# Patient Record
Sex: MALE | Race: WHITE | Employment: OTHER | URBAN - METROPOLITAN AREA
[De-identification: names, ages, dates, MRNs, and addresses within clinical notes are randomized per-mention and may not be internally consistent; named-entity substitution may affect disease eponyms.]

---

## 2017-08-14 RX ORDER — FLUOROURACIL 50 MG/G
CREAM TOPICAL AS NEEDED
COMMUNITY
End: 2019-09-30

## 2017-08-15 ENCOUNTER — HOSPITAL ENCOUNTER (OUTPATIENT)
Age: 72
Setting detail: OUTPATIENT SURGERY
Discharge: HOME | End: 2017-08-15
Attending: INTERNAL MEDICINE | Admitting: INTERNAL MEDICINE
Payer: MEDICARE

## 2017-08-15 ENCOUNTER — ANESTHESIA (OUTPATIENT)
Dept: ENDOSCOPY | Age: 72
End: 2017-08-15
Payer: MEDICARE

## 2017-08-15 ENCOUNTER — ANESTHESIA EVENT (OUTPATIENT)
Dept: ENDOSCOPY | Age: 72
End: 2017-08-15
Payer: MEDICARE

## 2017-08-15 VITALS
DIASTOLIC BLOOD PRESSURE: 77 MMHG | HEART RATE: 58 BPM | BODY MASS INDEX: 27.22 KG/M2 | SYSTOLIC BLOOD PRESSURE: 118 MMHG | HEIGHT: 67 IN | TEMPERATURE: 97.4 F | RESPIRATION RATE: 18 BRPM | OXYGEN SATURATION: 98 % | WEIGHT: 173.44 LBS

## 2017-08-15 PROCEDURE — 76040000019: Performed by: INTERNAL MEDICINE

## 2017-08-15 PROCEDURE — 76060000031 HC ANESTHESIA FIRST 0.5 HR: Performed by: INTERNAL MEDICINE

## 2017-08-15 PROCEDURE — 77030027957 HC TBNG IRR ENDOGTR BUSS -B: Performed by: INTERNAL MEDICINE

## 2017-08-15 PROCEDURE — 74011250636 HC RX REV CODE- 250/636

## 2017-08-15 PROCEDURE — 74011250636 HC RX REV CODE- 250/636: Performed by: INTERNAL MEDICINE

## 2017-08-15 PROCEDURE — 74011250637 HC RX REV CODE- 250/637: Performed by: INTERNAL MEDICINE

## 2017-08-15 PROCEDURE — 74011000250 HC RX REV CODE- 250

## 2017-08-15 RX ORDER — PROPOFOL 10 MG/ML
INJECTION, EMULSION INTRAVENOUS AS NEEDED
Status: DISCONTINUED | OUTPATIENT
Start: 2017-08-15 | End: 2017-08-15 | Stop reason: HOSPADM

## 2017-08-15 RX ORDER — LIDOCAINE HYDROCHLORIDE 20 MG/ML
INJECTION, SOLUTION EPIDURAL; INFILTRATION; INTRACAUDAL; PERINEURAL AS NEEDED
Status: DISCONTINUED | OUTPATIENT
Start: 2017-08-15 | End: 2017-08-15 | Stop reason: HOSPADM

## 2017-08-15 RX ORDER — DEXTROMETHORPHAN/PSEUDOEPHED 2.5-7.5/.8
1.2 DROPS ORAL
Status: DISCONTINUED | OUTPATIENT
Start: 2017-08-15 | End: 2017-08-15 | Stop reason: HOSPADM

## 2017-08-15 RX ORDER — ATROPINE SULFATE 0.1 MG/ML
0.5 INJECTION INTRAVENOUS
Status: DISCONTINUED | OUTPATIENT
Start: 2017-08-15 | End: 2017-08-15 | Stop reason: HOSPADM

## 2017-08-15 RX ORDER — SODIUM CHLORIDE 9 MG/ML
150 INJECTION, SOLUTION INTRAVENOUS CONTINUOUS
Status: DISCONTINUED | OUTPATIENT
Start: 2017-08-15 | End: 2017-08-15 | Stop reason: HOSPADM

## 2017-08-15 RX ORDER — SODIUM CHLORIDE 0.9 % (FLUSH) 0.9 %
5-10 SYRINGE (ML) INJECTION EVERY 8 HOURS
Status: DISCONTINUED | OUTPATIENT
Start: 2017-08-15 | End: 2017-08-15 | Stop reason: HOSPADM

## 2017-08-15 RX ORDER — EPINEPHRINE 0.1 MG/ML
1 INJECTION INTRACARDIAC; INTRAVENOUS
Status: DISCONTINUED | OUTPATIENT
Start: 2017-08-15 | End: 2017-08-15 | Stop reason: HOSPADM

## 2017-08-15 RX ORDER — MIDAZOLAM HYDROCHLORIDE 1 MG/ML
.25-5 INJECTION, SOLUTION INTRAMUSCULAR; INTRAVENOUS
Status: DISCONTINUED | OUTPATIENT
Start: 2017-08-15 | End: 2017-08-15 | Stop reason: HOSPADM

## 2017-08-15 RX ORDER — SODIUM CHLORIDE 0.9 % (FLUSH) 0.9 %
5-10 SYRINGE (ML) INJECTION AS NEEDED
Status: DISCONTINUED | OUTPATIENT
Start: 2017-08-15 | End: 2017-08-15 | Stop reason: HOSPADM

## 2017-08-15 RX ORDER — SODIUM CHLORIDE 9 MG/ML
INJECTION, SOLUTION INTRAVENOUS
Status: DISCONTINUED | OUTPATIENT
Start: 2017-08-15 | End: 2017-08-15 | Stop reason: HOSPADM

## 2017-08-15 RX ADMIN — LIDOCAINE HYDROCHLORIDE 60 MG: 20 INJECTION, SOLUTION EPIDURAL; INFILTRATION; INTRACAUDAL; PERINEURAL at 11:08

## 2017-08-15 RX ADMIN — PROPOFOL 30 MG: 10 INJECTION, EMULSION INTRAVENOUS at 11:18

## 2017-08-15 RX ADMIN — PROPOFOL 20 MG: 10 INJECTION, EMULSION INTRAVENOUS at 11:21

## 2017-08-15 RX ADMIN — PROPOFOL 20 MG: 10 INJECTION, EMULSION INTRAVENOUS at 11:16

## 2017-08-15 RX ADMIN — PROPOFOL 80 MG: 10 INJECTION, EMULSION INTRAVENOUS at 11:14

## 2017-08-15 RX ADMIN — PROPOFOL 20 MG: 10 INJECTION, EMULSION INTRAVENOUS at 11:24

## 2017-08-15 RX ADMIN — SODIUM CHLORIDE 150 ML/HR: 900 INJECTION, SOLUTION INTRAVENOUS at 10:47

## 2017-08-15 RX ADMIN — PROPOFOL 100 MG: 10 INJECTION, EMULSION INTRAVENOUS at 11:08

## 2017-08-15 RX ADMIN — PROPOFOL 20 MG: 10 INJECTION, EMULSION INTRAVENOUS at 11:11

## 2017-08-15 RX ADMIN — SODIUM CHLORIDE: 9 INJECTION, SOLUTION INTRAVENOUS at 11:00

## 2017-08-15 NOTE — IP AVS SNAPSHOT
[address] [address] 
[phone] Patient: Michel Muñoz MRN: [id number]:[id number] You are allergic to the following No active allergies Recent Documentation Height Weight BMI Smoking Status 1.702 m 78.7 kg 27.16 kg/m2 Former Smoker Emergency Contacts Name Discharge Info Relation Home Work Mobile Hossein Dill  Other Relative [6] [phone] About your hospitalization You were admitted on:  August 15, 2017 You last received care in the:  [facility] ENDOSCOPY You were discharged on:  August 15, 2017 Unit phone number:  [phone] Why you were hospitalized Your primary diagnosis was:  Not on File Providers Seen During Your Hospitalizations Provider Role Specialty Primary office phone Sarrah Cabot, MD Attending Provider Gastroenterology [phone] Your Primary Care Physician (PCP) Primary Care Physician Office Phone Office Fax [phone] [facility] [address] [phone] Follow-up Information None Current Discharge Medication List  
  
CONTINUE these medications which have NOT CHANGED Dose & Instructions Dispensing Information Comments Morning Noon Evening Bedtime ASPIRIN PO Your last dose was: Your next dose is:    
   
   
 Dose:  81 mg Take 81 mg by mouth daily. Refills:  0  
     
   
   
   
  
 fluorouracil 5 % chemo cream  
Commonly known as:  EFUDEX Your last dose was: Your next dose is:    
   
   
 Apply  to affected area two (2) times a day. Refills:  0 Discharge Instructions [address] Moiz Chong M.D. 
[address] [place] [address] [address]ky 
[phone] COLONOSCOPY DISCHARGE INSTRUCTIONS Michel Muñoz [id number] 
1945 DISCOMFORT: 
 Redness at IV site- apply warm compress to area; if redness or soreness persist- contact your physician There may be a slight amount of blood passed from the rectum Gaseous discomfort- walking, belching will help relieve any discomfort You may not operate a vehicle for 12 hours You may not engage in an occupation involving machinery or appliances for the  rest of today You may not drink alcoholic beverages for at least 12 hours Avoid making any critical decisions for at least 24 hours DIET: 
 You may resume your normal diet, but some patients find that heavy or large  meals may lead to indigestion or vomiting. I suggest a light meal as first food  intake. ACTIVITY: 
You may resume your normal daily activities. It is recommended that you spend the remainder of the day resting - avoid any strenuous activity. CALL VASILIY Cortez ANY SIGN OF: Increasing pain, nausea, vomiting Abdominal distension (swelling) Significant bleeding (oral or rectal) Fever Pain in chest area Shortness of breath Additional Instructions: 
 Call Dr. Job Mcgrath if any questions or problems at [phone] Should have a repeat colonoscopy in 5 years. Colon with moderate internal hemorrhoids. No polyps. Discharge Orders None Introducing [facility]! [employer] introduces [employer] patient portal. Now you can access parts of your medical record, email your doctor's office, and request medication refills online. 1. In your internet browser, go to [email or web address] 2. Click on the First Time User? Click Here link in the Sign In box. You will see the New Member Sign Up page. 3. Enter your [employer] Access Code exactly as it appears below. You will not need to use this code after youve completed the sign-up process. If you do not sign up before the expiration date, you must request a new code. · student[employer] Access Code: [email or web address] Expires: 11/13/2017  9:29 AM 
 
 4. Enter the last four digits of your Social Security Number (xxxx) and Date of Birth (mm/dd/yyyy) as indicated and click Submit. You will be taken to the next sign-up page. 5. Create a [employer] ID. This will be your [employer] login ID and cannot be changed, so think of one that is secure and easy to remember. 6. Create a [employer] password. You can change your password at any time. 7. Enter your Password Reset Question and Answer. This can be used at a later time if you forget your password. 8. Enter your e-mail address. You will receive e-mail notification when new information is available in [address] 9. Click Sign Up. You can now view and download portions of your medical record. 10. Click the Download Summary menu link to download a portable copy of your medical information. If you have questions, please visit the Frequently Asked Questions section of the [employer] website. Remember, [employer] is NOT to be used for urgent needs. For medical emergencies, dial 911. Now available from your iPhone and Android! General Information Please provide this summary of care documentation to your next provider. Patient Signature:  ____________________________________________________________ Date:  ____________________________________________________________  
  
[facility] Provider Signature:  ____________________________________________________________ Date:  ____________________________________________________________

## 2017-08-15 NOTE — PROCEDURES
Jordi Whitley [address] Job Mcgrath M.D.  [place], [address] [facility]  [phone]               Colonoscopy Procedure Note    NAME: Anthony Saenz  :  1945  MRN:  [id number]    Indications:   Personal history of colon polyps (screening only)     : Bashir Gordon MD    Referring Provider:  Shakila England MD    Medicines:  MAC anesthesia      Procedure Details:  After informed consent was obtained with all risks and benefits of the procedure explained and preprocedure exam completed, the patient was placed in the left lateral decubitus position. Universal protocol for patient identification was performed and documented in the nursing notes. Throughout the procedure, the patient's blood pressure was monitored at least every five minutes; pulse, and oxygen saturations were monitored continuously. All vital signs were documented in the nursing notes. A digital rectal exam was performed and was normal.  The Olympus videocolonoscope  was inserted in the rectum and carefully advanced to the cecum, which was identified by the ileocecal valve and appendiceal orifice. The colonoscope was slowly withdrawn with careful evaluation between folds. Retroflexion in the rectum was performed; findings and interventions are described below. Procedure start time, extent reached time/cecum time, and procedure end time are documented in the nursing notes. The quality of preparation was good. Findings:   Rectum: Grade moderate internal hemorrhoid(s); Sigmoid: normal  Descending Colon: normal  Transverse Colon: normal  Ascending Colon: normal  Cecum: normal    Interventions:    none    Specimens:   * No specimens in log *    EBL:  None. Complications:   No immediate complications     Impression:  -Moderate internal hemorrhoids, rest of the colon was normal.    Recommendations:   -Repeat colonoscopy in 5 years.    If < 10 years, reason:  above average risk patient    Resume normal medication(s). Signed by:  Leonidas Perkins MD          8/15/2017  11:29 AM

## 2017-08-15 NOTE — H&P
[address], [address] [place] [address]          Pre-procedure History and Physical       NAME:  Ric Blizzard   :   1945   MRN:   [id number]     CHIEF COMPLAINT/HPI: See procedure note    PMH:  Past Medical History:   Diagnosis Date    Basal cell carcinoma of skin of scalp and neck     [facility]    Cancer [facility]) 2002    Basal Cancer /head    Dyslexia     Varicocele        PSH:  Past Surgical History:   Procedure Laterality Date    HX GI      colonoscopy x 2 - last     HX OTHER SURGICAL      BCCAs removed - several times       Allergies:  No Known Allergies    Home Medications:  Prior to Admission Medications   Prescriptions Last Dose Informant Patient Reported? Taking? ASPIRIN PO 8/15/2017 at Unknown time  Yes Yes   Sig: Take 81 mg by mouth daily. fluorouracil (EFUDEX) 5 % chemo cream 2017 at Unknown time  Yes Yes   Sig: Apply  to affected area two (2) times a day.       Facility-Administered Medications: None       Hospital Medications:  Current Facility-Administered Medications   Medication Dose Route Frequency    0.9% sodium chloride infusion  150 mL/hr IntraVENous CONTINUOUS    sodium chloride (NS) flush 5-10 mL  5-10 mL IntraVENous Q8H    sodium chloride (NS) flush 5-10 mL  5-10 mL IntraVENous PRN    midazolam (VERSED) injection 0.25-5 mg  0.25-5 mg IntraVENous Multiple    simethicone (MYLICON) 50QC/3.5LJ oral drops 80 mg  1.2 mL Oral Multiple    atropine injection 0.5 mg  0.5 mg IntraVENous ONCE PRN    EPINEPHrine (ADRENALIN) 0.1 mg/mL syringe 1 mg  1 mg Endoscopically ONCE PRN     Facility-Administered Medications Ordered in Other Encounters   Medication Dose Route Frequency    0.9% sodium chloride infusion   IntraVENous CONTINUOUS       Family History:  Family History   Problem Relation Age of Onset    Arthritis-osteo Mother     Arrhythmia Mother     Other Mother      brain hemorrhage    Other Father      PUD - removed part of stomach/\"cauterized stomach\"    Liver Disease Father      hepatitis due to blood transfusion    Heart Attack Father      during procedure or surgery    Liver Disease Brother      hepatitis from meat    Dementia Brother     Heart Attack Brother     Stroke Brother     Other Brother      \"cauterized stomach\"    Depression Brother     Anesth Problems Neg Hx        Social History:  Social History   Substance Use Topics    Smoking status: Former Smoker    Smokeless tobacco: Never Used      Comment: smoked 25 yr - 25 yrs old    Alcohol use No         PHYSICAL EXAM PRIOR TO SEDATION:  General: Alert, in no acute distress    Lungs:            CTA bilaterally  Heart:  Normal S1, S2    Abdomen: Soft, Non distended, Non tender. Normoactive bowel sounds. Assessment:   Stable for sedation administration.   Date of last colonoscopy: 5 yrs, Polyps  No    Plan:   · Endoscopic procedure with sedation     Signed By: Andrews Chappell MD     8/15/2017  11:09 AM

## 2017-08-15 NOTE — ANESTHESIA POSTPROCEDURE EVALUATION
Post-Anesthesia Evaluation and Assessment    Patient: Scooby Mcclain MRN: [id number]  SSN: [id number]    YOB: 1945  Age: 71 y.o. Sex: male       Cardiovascular Function/Vital Signs  Visit Vitals    /77    Pulse (!) 58    Temp 36.3 °C (97.4 °F)    Resp 18    Ht 5' 7\" (1.702 m)    Wt 78.7 kg (173 lb 7 oz)    SpO2 98%    BMI 27.16 kg/m2       Patient is status post MAC anesthesia for Procedure(s):  COLONOSCOPY. Nausea/Vomiting: None    Postoperative hydration reviewed and adequate. Pain:  Pain Scale 1: Numeric (0 - 10) (08/15/17 1200)  Pain Intensity 1: 0 (08/15/17 1200)   Managed    Neurological Status: At baseline    Mental Status and Level of Consciousness: Arousable    Pulmonary Status:   O2 Device: Room air (08/15/17 1200)   Adequate oxygenation and airway patent    Complications related to anesthesia: None    Post-anesthesia assessment completed.  No concerns    Signed By: Hao Molina DO     August 15, 2017

## 2017-08-15 NOTE — DISCHARGE INSTRUCTIONS
[address] [facility] Gabriel Reed M.D.  [address] [facility], [address] [facility]  [phone]          COLONOSCOPY DISCHARGE INSTRUCTIONS    Ric Blizzard  [id number]  1945    DISCOMFORT:  Redness at IV site- apply warm compress to area; if redness or soreness persist- contact your physician  There may be a slight amount of blood passed from the rectum  Gaseous discomfort- walking, belching will help relieve any discomfort  You may not operate a vehicle for 12 hours  You may not engage in an occupation involving machinery or appliances for the  rest of today  You may not drink alcoholic beverages for at least 12 hours  Avoid making any critical decisions for at least 24 hours    DIET:   You may resume your normal diet, but some patients find that heavy or large  meals may lead to indigestion or vomiting. I suggest a light meal as first food  intake. ACTIVITY:  You may resume your normal daily activities. It is recommended that you spend the remainder of the day resting - avoid any strenuous activity. CALL VASILIY Rodriguez ANY SIGN OF:   Increasing pain, nausea, vomiting  Abdominal distension (swelling)  Significant bleeding (oral or rectal)  Fever   Pain in chest area  Shortness of breath    Additional Instructions:   Call Dr. Gabriel Reed if any questions or problems at [phone]   Should have a repeat colonoscopy in 5 years. Colon with moderate internal hemorrhoids. No polyps.

## 2017-08-15 NOTE — PERIOP NOTES
TRANSFER - IN REPORT:    Verbal report received from Ramiro(name) on Kenny Clipper  being received for ordered procedure      Report consisted of patients Situation, Background, Assessment and   Recommendations(SBAR). Information from the following report(s)sbar was reviewed with the receiving nurse. Opportunity for questions and clarification was provided. Assessment completed upon patients arrival to unit and care assumed. May speak to his assistant, Daphney Marc. Patient has been evaluated by anesthesia pre-procedure. Patient alert and oriented. Vital signs will not be charted by the Endoscopy nurse. All vitals, airway, and loc are monitored by anesthesia staff throughout procedure. .Endoscope was pre-cleaned at bedside immediately following procedure by CORNELIUS Ennis

## 2018-05-27 ENCOUNTER — APPOINTMENT (OUTPATIENT)
Dept: GENERAL RADIOLOGY | Age: 73
End: 2018-05-27
Attending: EMERGENCY MEDICINE
Payer: MEDICARE

## 2018-05-27 ENCOUNTER — HOSPITAL ENCOUNTER (EMERGENCY)
Age: 73
Discharge: HOME | End: 2018-05-27
Attending: EMERGENCY MEDICINE
Payer: MEDICARE

## 2018-05-27 VITALS
TEMPERATURE: 98.4 F | RESPIRATION RATE: 17 BRPM | WEIGHT: 185 LBS | SYSTOLIC BLOOD PRESSURE: 122 MMHG | BODY MASS INDEX: 28.04 KG/M2 | HEIGHT: 68 IN | HEART RATE: 72 BPM | OXYGEN SATURATION: 98 % | DIASTOLIC BLOOD PRESSURE: 73 MMHG

## 2018-05-27 DIAGNOSIS — I48.91 ATRIAL FIBRILLATION WITH RAPID VENTRICULAR RESPONSE (HCC): Primary | ICD-10-CM

## 2018-05-27 LAB
ALBUMIN SERPL-MCNC: 3.9 G/DL (ref 3.5–5)
ALBUMIN/GLOB SERPL: 1 {RATIO} (ref 1.1–2.2)
ALP SERPL-CCNC: 120 U/L (ref 45–117)
ALT SERPL-CCNC: 31 U/L (ref 12–78)
ANION GAP SERPL CALC-SCNC: 8 MMOL/L (ref 5–15)
AST SERPL-CCNC: 26 U/L (ref 15–37)
BASOPHILS # BLD: 0.1 K/UL (ref 0–0.1)
BASOPHILS NFR BLD: 1 % (ref 0–1)
BILIRUB SERPL-MCNC: 0.4 MG/DL (ref 0.2–1)
BUN SERPL-MCNC: 25 MG/DL (ref 6–20)
BUN/CREAT SERPL: 19 (ref 12–20)
CALCIUM SERPL-MCNC: 9.3 MG/DL (ref 8.5–10.1)
CHLORIDE SERPL-SCNC: 107 MMOL/L (ref 97–108)
CO2 SERPL-SCNC: 27 MMOL/L (ref 21–32)
CREAT SERPL-MCNC: 1.29 MG/DL (ref 0.7–1.3)
DIFFERENTIAL METHOD BLD: NORMAL
EOSINOPHIL # BLD: 0.1 K/UL (ref 0–0.4)
EOSINOPHIL NFR BLD: 1 % (ref 0–7)
ERYTHROCYTE [DISTWIDTH] IN BLOOD BY AUTOMATED COUNT: 13.7 % (ref 11.5–14.5)
GLOBULIN SER CALC-MCNC: 4 G/DL (ref 2–4)
GLUCOSE SERPL-MCNC: 121 MG/DL (ref 65–100)
HCT VFR BLD AUTO: 42.4 % (ref 36.6–50.3)
HGB BLD-MCNC: 14.1 G/DL (ref 12.1–17)
IMM GRANULOCYTES # BLD: 0 K/UL (ref 0–0.04)
IMM GRANULOCYTES NFR BLD AUTO: 0 % (ref 0–0.5)
LYMPHOCYTES # BLD: 2.9 K/UL (ref 0.8–3.5)
LYMPHOCYTES NFR BLD: 34 % (ref 12–49)
MCH RBC QN AUTO: 31.1 PG (ref 26–34)
MCHC RBC AUTO-ENTMCNC: 33.3 G/DL (ref 30–36.5)
MCV RBC AUTO: 93.4 FL (ref 80–99)
MONOCYTES # BLD: 0.9 K/UL (ref 0–1)
MONOCYTES NFR BLD: 10 % (ref 5–13)
NEUTS SEG # BLD: 4.5 K/UL (ref 1.8–8)
NEUTS SEG NFR BLD: 54 % (ref 32–75)
NRBC # BLD: 0 K/UL (ref 0–0.01)
NRBC BLD-RTO: 0 PER 100 WBC
PLATELET # BLD AUTO: 267 K/UL (ref 150–400)
PMV BLD AUTO: 10.4 FL (ref 8.9–12.9)
POTASSIUM SERPL-SCNC: 3.9 MMOL/L (ref 3.5–5.1)
PROT SERPL-MCNC: 7.9 G/DL (ref 6.4–8.2)
RBC # BLD AUTO: 4.54 M/UL (ref 4.1–5.7)
SODIUM SERPL-SCNC: 142 MMOL/L (ref 136–145)
TROPONIN I SERPL-MCNC: <0.04 NG/ML
TSH SERPL DL<=0.05 MIU/L-ACNC: 2.55 UIU/ML (ref 0.36–3.74)
WBC # BLD AUTO: 8.5 K/UL (ref 4.1–11.1)

## 2018-05-27 PROCEDURE — 85025 COMPLETE CBC W/AUTO DIFF WBC: CPT | Performed by: EMERGENCY MEDICINE

## 2018-05-27 PROCEDURE — 99285 EMERGENCY DEPT VISIT HI MDM: CPT

## 2018-05-27 PROCEDURE — 93005 ELECTROCARDIOGRAM TRACING: CPT

## 2018-05-27 PROCEDURE — 80053 COMPREHEN METABOLIC PANEL: CPT | Performed by: EMERGENCY MEDICINE

## 2018-05-27 PROCEDURE — 96360 HYDRATION IV INFUSION INIT: CPT

## 2018-05-27 PROCEDURE — 74011250637 HC RX REV CODE- 250/637: Performed by: EMERGENCY MEDICINE

## 2018-05-27 PROCEDURE — 96361 HYDRATE IV INFUSION ADD-ON: CPT

## 2018-05-27 PROCEDURE — 71046 X-RAY EXAM CHEST 2 VIEWS: CPT

## 2018-05-27 PROCEDURE — 84484 ASSAY OF TROPONIN QUANT: CPT | Performed by: EMERGENCY MEDICINE

## 2018-05-27 PROCEDURE — 74011250636 HC RX REV CODE- 250/636: Performed by: EMERGENCY MEDICINE

## 2018-05-27 PROCEDURE — 36415 COLL VENOUS BLD VENIPUNCTURE: CPT | Performed by: EMERGENCY MEDICINE

## 2018-05-27 PROCEDURE — 84443 ASSAY THYROID STIM HORMONE: CPT | Performed by: EMERGENCY MEDICINE

## 2018-05-27 RX ORDER — METOPROLOL SUCCINATE 50 MG/1
25 TABLET, EXTENDED RELEASE ORAL
Status: COMPLETED | OUTPATIENT
Start: 2018-05-27 | End: 2018-05-27

## 2018-05-27 RX ORDER — METOPROLOL SUCCINATE 25 MG/1
25 TABLET, EXTENDED RELEASE ORAL DAILY
Qty: 30 TAB | Refills: 0 | Status: SHIPPED | OUTPATIENT
Start: 2018-05-27

## 2018-05-27 RX ADMIN — METOPROLOL SUCCINATE 25 MG: 50 TABLET, EXTENDED RELEASE ORAL at 19:42

## 2018-05-27 RX ADMIN — SODIUM CHLORIDE 1000 ML: 900 INJECTION, SOLUTION INTRAVENOUS at 17:50

## 2018-05-27 NOTE — ED PROVIDER NOTES
HPI Comments: 68 y.o. male with past medical history significant for basal cancer and varicocele who presents from home accompanied by friend with chief complaint of chest pain. Patient reports an occasional  \"twinge\" in his chest for months. Today, patient checked his HR on his apple watch when he felt the twinge and his HR was going back and forth between . His friend who is a MD came to see him and advised that he go to the ED. Patient does not feel the twinge now. He has not been getting adequate sleep due to work and travel. He drinks tea and coke daily. No new OTC meds. He takes a baby aspirin daily and took two day because of the twinge. No nausea, vomiting, shortness of breath, blood/black stools, lightheadedness, or dizziness. Patient has never seen a cardiologist. There are no other acute medical concerns at this time. Social hx: Former smoker. No alcohol use. No illicit drug use. PCP: Mandy Haro MD    Note written by Jass Campuzano, as dictated by Nanci Gabriel MD 4:52 PM      The history is provided by the patient.         Past Medical History:   Diagnosis Date    [facility]al [facility] and neck     [facility]    Cancer [facility]) 2002    Basal Cancer /head    Dyslexia     Varicocele        Past Surgical History:   Procedure Laterality Date    COLONOSCOPY N/A 8/15/2017    COLONOSCOPY performed by Shalonda Souza MD at [address] HX GI      colonoscopy x 2 - last 2012    HX OTHER SURGICAL      BCCAs removed - several times         Family History:   Problem Relation Age of Onset    Arthritis-osteo Mother     Arrhythmia Mother     Other Mother      brain hemorrhage    Other Father      PUD - removed part of stomach/\"cauterized stomach\"    Liver Disease Father      hepatitis due to blood transfusion    Heart Attack Father      during procedure or surgery    Liver Disease Brother      hepatitis from meat    Dementia Brother     Heart Attack Brother  Stroke Brother     Other Brother      \"cauterized stomach\"    Depression Brother     Anesth Problems Neg Hx        Social History     Social History    Marital status: SINGLE     Spouse name: N/A    Number of children: N/A    Years of education: N/A     Occupational History    Not on file. Social History Main Topics    Smoking status: Former Smoker    Smokeless tobacco: Never Used      Comment: smoked 25 yr - 25 yrs old    Alcohol use No    Drug use: No    Sexual activity: Not on file     Other Topics Concern    Not on file     Social History Narrative         ALLERGIES: Review of patient's allergies indicates no known allergies. Review of Systems   Constitutional: Negative for chills and fever. HENT: Negative for rhinorrhea and sore throat. Respiratory: Negative for cough and shortness of breath. Cardiovascular: Positive for chest pain (\"twinge\"). Gastrointestinal: Negative for abdominal pain, diarrhea, nausea and vomiting. Genitourinary: Negative for dysuria and hematuria. Musculoskeletal: Negative for arthralgias and myalgias. Skin: Negative for pallor and rash. Neurological: Negative for dizziness, weakness and light-headedness. All other systems reviewed and are negative. Note written by Jass Hairston, as dictated by Clark Blizzard, MD 4:52 PM    Vitals:    05/27/18 1620 05/27/18 1640   BP: 123/82    Pulse: (!) 139    Resp: 18    Temp: 98 °F (36.7 °C)    SpO2: 97% 96%   Weight: 83.9 kg (185 lb)    Height: 5' 8\" (1.727 m)             Physical Exam     Vital signs reviewed. Nursing notes reviewed.     Const:  No acute distress, well developed, well nourished  Head:  Atraumatic, normocephalic  Eyes:  PERRL, conjunctiva normal, no scleral icterus  Neck:  Supple, trachea midline  Cardiovascular:  RRR, no murmurs, no gallops, no rubs  Resp:  No resp distress, no increased work of breathing, no wheezes, no rhonchi, no rales,  Abd:  Soft, non-tender, non-distended, no rebound, no guarding, no CVA tenderness  :  Deferred  MSK:  No pedal edema, normal ROM  Neuro:  Alert and oriented x3, no cranial nerve defect  Skin:  Warm, dry, intact  Psych: normal mood and affect, behavior is normal, judgement and thought content is normal    Note written by Jass Conway, as dictated by Ayesha Butler MD 4:52 PM    MDM  Number of Diagnoses or Management Options  Atrial fibrillation with rapid ventricular response [facility]):      Amount and/or Complexity of Data Reviewed  Clinical lab tests: ordered and reviewed  Tests in the radiology section of CPT®: ordered and reviewed  Review and summarize past medical records: yes    Patient Progress  Patient progress: stable    ED Course     Pt. Presents to the ER in atrial fibrillation with rapid ventricular response. Her converted to a sinus rhythm on his own. Pt. Remained in a sinus rhythm in the ER. I spoke with cardiology and will start him on toprol xl 25mg daily. Pt. To f/u with cardiology or return to the ER with worsening sx.     Procedures

## 2018-05-27 NOTE — DISCHARGE INSTRUCTIONS
Atrial Fibrillation: Care Instructions  Your Care Instructions    Atrial fibrillation is an irregular and often fast heartbeat. Treating this condition is important for several reasons. It can cause blood clots, which can travel from your heart to your brain and cause a stroke. If you have a fast heartbeat, you may feel lightheaded, dizzy, and weak. An irregular heartbeat can also increase your risk for heart failure. Atrial fibrillation is often the result of another heart condition, such as high blood pressure or coronary artery disease. Making changes to improve your heart condition will help you stay healthy and active. Follow-up care is a key part of your treatment and safety. Be sure to make and go to all appointments, and call your doctor if you are having problems. It's also a good idea to know your test results and keep a list of the medicines you take. How can you care for yourself at home? Medicines  ? · Take your medicines exactly as prescribed. Call your doctor if you think you are having a problem with your medicine. You will get more details on the specific medicines your doctor prescribes. ? · If your doctor has given you a blood thinner to prevent a stroke, be sure you get instructions about how to take your medicine safely. Blood thinners can cause serious bleeding problems. ? · Do not take any vitamins, over-the-counter drugs, or herbal products without talking to your doctor first.   ? Lifestyle changes  ? · Do not smoke. Smoking can increase your chance of a stroke and heart attack. If you need help quitting, talk to your doctor about stop-smoking programs and medicines. These can increase your chances of quitting for good. ? · Eat a heart-healthy diet. ? · Stay at a healthy weight. Lose weight if you need to.   ? · Limit alcohol to 2 drinks a day for men and 1 drink a day for women. Too much alcohol can cause health problems. ? · Avoid colds and flu.  Get a pneumococcal vaccine shot. If you have had one before, ask your doctor whether you need another dose. Get a flu shot every year. If you must be around people with colds or flu, wash your hands often. Activity  ? · If your doctor recommends it, get more exercise. Walking is a good choice. Bit by bit, increase the amount you walk every day. Try for at least 30 minutes on most days of the week. You also may want to swim, bike, or do other activities. Your doctor may suggest that you join a cardiac rehabilitation program so that you can have help increasing your physical activity safely. ? · Start light exercise if your doctor says it is okay. Even a small amount will help you get stronger, have more energy, and manage stress. Walking is an easy way to get exercise. Start out by walking a little more than you did in the hospital. Gradually increase the amount you walk. ? · When you exercise, watch for signs that your heart is working too hard. You are pushing too hard if you cannot talk while you are exercising. If you become short of breath or dizzy or have chest pain, sit down and rest immediately. ? · Check your pulse regularly. Place two fingers on the artery at the palm side of your wrist, in line with your thumb. If your heartbeat seems uneven or fast, talk to your doctor. When should you call for help? Call 911 anytime you think you may need emergency care. For example, call if:  ? · You have symptoms of a heart attack. These may include:  ¨ Chest pain or pressure, or a strange feeling in the chest.  ¨ Sweating. ¨ Shortness of breath. ¨ Nausea or vomiting. ¨ Pain, pressure, or a strange feeling in the back, neck, jaw, or upper belly or in one or both shoulders or arms. ¨ Lightheadedness or sudden weakness. ¨ A fast or irregular heartbeat. After you call 911, the  may tell you to chew 1 adult-strength or 2 to 4 low-dose aspirin. Wait for an ambulance. Do not try to drive yourself.    ? · You have symptoms of a stroke. These may include:  ¨ Sudden numbness, tingling, weakness, or loss of movement in your face, arm, or leg, especially on only one side of your body. ¨ Sudden vision changes. ¨ Sudden trouble speaking. ¨ Sudden confusion or trouble understanding simple statements. ¨ Sudden problems with walking or balance. ¨ A sudden, severe headache that is different from past headaches. ? · You passed out (lost consciousness). ?Call your doctor now or seek immediate medical care if:  ? · You have new or increased shortness of breath. ? · You feel dizzy or lightheaded, or you feel like you may faint. ? · Your heart rate becomes irregular. ? · You can feel your heart flutter in your chest or skip heartbeats. Tell your doctor if these symptoms are new or worse. ? Watch closely for changes in your health, and be sure to contact your doctor if you have any problems. Where can you learn more? Go to http://brayan-solo.info/. Enter U020 in the search box to learn more about \"Atrial Fibrillation: Care Instructions. \"  Current as of: September 21, 2016  Content Version: 11.4  © 3317-5483 [employer]. Care instructions adapted under license by [employer] (which disclaims liability or warranty for this information). If you have questions about a medical condition or this instruction, always ask your healthcare professional. [address] any warranty or liability for your use of this information.

## 2018-05-27 NOTE — ED TRIAGE NOTES
Pt complains of feeling weird heartbeat and states his apple watch was going from  (back and forth). Denies shortness of breath and chest pain/discomfort.

## 2018-05-28 LAB
ATRIAL RATE: 156 BPM
ATRIAL RATE: 80 BPM
CALCULATED P AXIS, ECG09: 54 DEGREES
CALCULATED R AXIS, ECG10: -17 DEGREES
CALCULATED R AXIS, ECG10: -36 DEGREES
CALCULATED T AXIS, ECG11: 46 DEGREES
CALCULATED T AXIS, ECG11: 90 DEGREES
DIAGNOSIS, 93000: NORMAL
DIAGNOSIS, 93000: NORMAL
P-R INTERVAL, ECG05: 168 MS
Q-T INTERVAL, ECG07: 310 MS
Q-T INTERVAL, ECG07: 354 MS
QRS DURATION, ECG06: 106 MS
QRS DURATION, ECG06: 110 MS
QTC CALCULATION (BEZET), ECG08: 408 MS
QTC CALCULATION (BEZET), ECG08: 491 MS
VENTRICULAR RATE, ECG03: 151 BPM
VENTRICULAR RATE, ECG03: 80 BPM

## 2019-09-25 ENCOUNTER — HOSPITAL ENCOUNTER (OUTPATIENT)
Dept: PREADMISSION TESTING | Age: 74
Discharge: HOME | End: 2019-09-25
Payer: MEDICARE

## 2019-09-25 VITALS
DIASTOLIC BLOOD PRESSURE: 67 MMHG | TEMPERATURE: 97.9 F | WEIGHT: 184 LBS | SYSTOLIC BLOOD PRESSURE: 116 MMHG | HEART RATE: 58 BPM | HEIGHT: 68 IN | BODY MASS INDEX: 27.89 KG/M2

## 2019-09-25 LAB
BASOPHILS # BLD: 0.1 K/UL (ref 0–0.1)
BASOPHILS NFR BLD: 1 % (ref 0–1)
DIFFERENTIAL METHOD BLD: ABNORMAL
EOSINOPHIL # BLD: 0.2 K/UL (ref 0–0.4)
EOSINOPHIL NFR BLD: 5 % (ref 0–7)
ERYTHROCYTE [DISTWIDTH] IN BLOOD BY AUTOMATED COUNT: 13.1 % (ref 11.5–14.5)
HCT VFR BLD AUTO: 38.2 % (ref 36.6–50.3)
HGB BLD-MCNC: 12.5 G/DL (ref 12.1–17)
IMM GRANULOCYTES # BLD AUTO: 0 K/UL (ref 0–0.04)
IMM GRANULOCYTES NFR BLD AUTO: 0 % (ref 0–0.5)
LYMPHOCYTES # BLD: 1.9 K/UL (ref 0.8–3.5)
LYMPHOCYTES NFR BLD: 39 % (ref 12–49)
MCH RBC QN AUTO: 30.8 PG (ref 26–34)
MCHC RBC AUTO-ENTMCNC: 32.7 G/DL (ref 30–36.5)
MCV RBC AUTO: 94.1 FL (ref 80–99)
MONOCYTES # BLD: 0.5 K/UL (ref 0–1)
MONOCYTES NFR BLD: 11 % (ref 5–13)
NEUTS SEG # BLD: 2.2 K/UL (ref 1.8–8)
NEUTS SEG NFR BLD: 44 % (ref 32–75)
NRBC # BLD: 0 K/UL (ref 0–0.01)
NRBC BLD-RTO: 0 PER 100 WBC
PLATELET # BLD AUTO: 236 K/UL (ref 150–400)
PMV BLD AUTO: 10.8 FL (ref 8.9–12.9)
RBC # BLD AUTO: 4.06 M/UL (ref 4.1–5.7)
WBC # BLD AUTO: 5 K/UL (ref 4.1–11.1)

## 2019-09-25 PROCEDURE — 85025 COMPLETE CBC W/AUTO DIFF WBC: CPT

## 2019-09-25 PROCEDURE — 93005 ELECTROCARDIOGRAM TRACING: CPT

## 2019-09-26 LAB
ATRIAL RATE: 54 BPM
CALCULATED P AXIS, ECG09: 44 DEGREES
CALCULATED R AXIS, ECG10: -12 DEGREES
CALCULATED T AXIS, ECG11: 31 DEGREES
DIAGNOSIS, 93000: NORMAL
P-R INTERVAL, ECG05: 188 MS
Q-T INTERVAL, ECG07: 420 MS
QRS DURATION, ECG06: 106 MS
QTC CALCULATION (BEZET), ECG08: 398 MS
VENTRICULAR RATE, ECG03: 54 BPM

## 2019-09-27 RX ORDER — CEFAZOLIN SODIUM/WATER 2 G/20 ML
2 SYRINGE (ML) INTRAVENOUS ONCE
Status: CANCELLED | OUTPATIENT
Start: 2019-09-30 | End: 2019-09-30

## 2019-09-30 ENCOUNTER — HOSPITAL ENCOUNTER (OUTPATIENT)
Age: 74
Setting detail: OUTPATIENT SURGERY
Discharge: HOME | End: 2019-09-30
Attending: PLASTIC SURGERY | Admitting: PLASTIC SURGERY
Payer: MEDICARE

## 2019-09-30 ENCOUNTER — ANESTHESIA (OUTPATIENT)
Dept: MEDSURG UNIT | Age: 74
End: 2019-09-30
Payer: MEDICARE

## 2019-09-30 ENCOUNTER — ANESTHESIA EVENT (OUTPATIENT)
Dept: MEDSURG UNIT | Age: 74
End: 2019-09-30
Payer: MEDICARE

## 2019-09-30 VITALS
TEMPERATURE: 97.2 F | OXYGEN SATURATION: 97 % | HEART RATE: 81 BPM | SYSTOLIC BLOOD PRESSURE: 142 MMHG | RESPIRATION RATE: 12 BRPM | BODY MASS INDEX: 27.89 KG/M2 | DIASTOLIC BLOOD PRESSURE: 77 MMHG | WEIGHT: 184 LBS | HEIGHT: 68 IN

## 2019-09-30 DIAGNOSIS — C44.319 BASAL CELL CARCINOMA (BCC) OF FOREHEAD: Primary | ICD-10-CM

## 2019-09-30 PROCEDURE — 77030040361 HC SLV COMPR DVT MDII -B: Performed by: PLASTIC SURGERY

## 2019-09-30 PROCEDURE — 76210000046 HC AMBSU PH II REC FIRST 0.5 HR: Performed by: PLASTIC SURGERY

## 2019-09-30 PROCEDURE — 88332 PATH CONSLTJ SURG EA ADD BLK: CPT

## 2019-09-30 PROCEDURE — 74011000250 HC RX REV CODE- 250: Performed by: NURSE ANESTHETIST, CERTIFIED REGISTERED

## 2019-09-30 PROCEDURE — 77030040356 HC CORD BPLR FRCP COVD -A: Performed by: PLASTIC SURGERY

## 2019-09-30 PROCEDURE — 74011250637 HC RX REV CODE- 250/637: Performed by: ANESTHESIOLOGY

## 2019-09-30 PROCEDURE — 76030000004 HC AMB SURG OR TIME 2 TO 2.5: Performed by: PLASTIC SURGERY

## 2019-09-30 PROCEDURE — 77030008467 HC STPLR SKN COVD -B: Performed by: PLASTIC SURGERY

## 2019-09-30 PROCEDURE — 77030020782 HC GWN BAIR PAWS FLX 3M -B

## 2019-09-30 PROCEDURE — 76210000034 HC AMBSU PH I REC 0.5 TO 1 HR: Performed by: PLASTIC SURGERY

## 2019-09-30 PROCEDURE — 76060000064 HC AMB SURG ANES 2 TO 2.5 HR: Performed by: PLASTIC SURGERY

## 2019-09-30 PROCEDURE — 74011250636 HC RX REV CODE- 250/636: Performed by: NURSE ANESTHETIST, CERTIFIED REGISTERED

## 2019-09-30 PROCEDURE — 77030026438 HC STYL ET INTUB CARD -A: Performed by: ANESTHESIOLOGY

## 2019-09-30 PROCEDURE — 88305 TISSUE EXAM BY PATHOLOGIST: CPT

## 2019-09-30 PROCEDURE — 77030002888 HC SUT CHRMC J&J -A: Performed by: PLASTIC SURGERY

## 2019-09-30 PROCEDURE — 77030011640 HC PAD GRND REM COVD -A: Performed by: PLASTIC SURGERY

## 2019-09-30 PROCEDURE — 74011000250 HC RX REV CODE- 250: Performed by: PLASTIC SURGERY

## 2019-09-30 PROCEDURE — 77030018836 HC SOL IRR NACL ICUM -A: Performed by: PLASTIC SURGERY

## 2019-09-30 PROCEDURE — 77030008684 HC TU ET CUF COVD -B: Performed by: ANESTHESIOLOGY

## 2019-09-30 PROCEDURE — 77030002986 HC SUT PROL J&J -A: Performed by: PLASTIC SURGERY

## 2019-09-30 PROCEDURE — 74011250636 HC RX REV CODE- 250/636: Performed by: ANESTHESIOLOGY

## 2019-09-30 PROCEDURE — 88331 PATH CONSLTJ SURG 1 BLK 1SPC: CPT

## 2019-09-30 PROCEDURE — 77030031139 HC SUT VCRL2 J&J -A: Performed by: PLASTIC SURGERY

## 2019-09-30 RX ORDER — SODIUM CHLORIDE, SODIUM LACTATE, POTASSIUM CHLORIDE, CALCIUM CHLORIDE 600; 310; 30; 20 MG/100ML; MG/100ML; MG/100ML; MG/100ML
INJECTION, SOLUTION INTRAVENOUS
Status: DISCONTINUED | OUTPATIENT
Start: 2019-09-30 | End: 2019-09-30 | Stop reason: HOSPADM

## 2019-09-30 RX ORDER — LIDOCAINE HYDROCHLORIDE AND EPINEPHRINE 10; 10 MG/ML; UG/ML
INJECTION, SOLUTION INFILTRATION; PERINEURAL AS NEEDED
Status: DISCONTINUED | OUTPATIENT
Start: 2019-09-30 | End: 2019-09-30 | Stop reason: HOSPADM

## 2019-09-30 RX ORDER — MIDAZOLAM HYDROCHLORIDE 1 MG/ML
1 INJECTION, SOLUTION INTRAMUSCULAR; INTRAVENOUS AS NEEDED
Status: DISCONTINUED | OUTPATIENT
Start: 2019-09-30 | End: 2019-09-30 | Stop reason: HOSPADM

## 2019-09-30 RX ORDER — HYDROMORPHONE HYDROCHLORIDE 2 MG/ML
INJECTION, SOLUTION INTRAMUSCULAR; INTRAVENOUS; SUBCUTANEOUS AS NEEDED
Status: DISCONTINUED | OUTPATIENT
Start: 2019-09-30 | End: 2019-09-30 | Stop reason: HOSPADM

## 2019-09-30 RX ORDER — LIDOCAINE HYDROCHLORIDE 10 MG/ML
0.1 INJECTION, SOLUTION EPIDURAL; INFILTRATION; INTRACAUDAL; PERINEURAL AS NEEDED
Status: DISCONTINUED | OUTPATIENT
Start: 2019-09-30 | End: 2019-09-30 | Stop reason: HOSPADM

## 2019-09-30 RX ORDER — HYDROMORPHONE HYDROCHLORIDE 1 MG/ML
0.2 INJECTION, SOLUTION INTRAMUSCULAR; INTRAVENOUS; SUBCUTANEOUS
Status: CANCELLED | OUTPATIENT
Start: 2019-09-30

## 2019-09-30 RX ORDER — ONDANSETRON 2 MG/ML
4 INJECTION INTRAMUSCULAR; INTRAVENOUS AS NEEDED
Status: CANCELLED | OUTPATIENT
Start: 2019-09-30

## 2019-09-30 RX ORDER — FENTANYL CITRATE 50 UG/ML
25 INJECTION, SOLUTION INTRAMUSCULAR; INTRAVENOUS
Status: CANCELLED | OUTPATIENT
Start: 2019-09-30

## 2019-09-30 RX ORDER — CEFAZOLIN SODIUM 1 G/3ML
INJECTION, POWDER, FOR SOLUTION INTRAMUSCULAR; INTRAVENOUS AS NEEDED
Status: DISCONTINUED | OUTPATIENT
Start: 2019-09-30 | End: 2019-09-30 | Stop reason: HOSPADM

## 2019-09-30 RX ORDER — PROPOFOL 10 MG/ML
INJECTION, EMULSION INTRAVENOUS AS NEEDED
Status: DISCONTINUED | OUTPATIENT
Start: 2019-09-30 | End: 2019-09-30 | Stop reason: HOSPADM

## 2019-09-30 RX ORDER — SODIUM CHLORIDE 0.9 % (FLUSH) 0.9 %
5-40 SYRINGE (ML) INJECTION EVERY 8 HOURS
Status: CANCELLED | OUTPATIENT
Start: 2019-09-30

## 2019-09-30 RX ORDER — ACETAMINOPHEN 325 MG/1
650 TABLET ORAL ONCE
Status: COMPLETED | OUTPATIENT
Start: 2019-09-30 | End: 2019-09-30

## 2019-09-30 RX ORDER — ROCURONIUM BROMIDE 10 MG/ML
INJECTION, SOLUTION INTRAVENOUS AS NEEDED
Status: DISCONTINUED | OUTPATIENT
Start: 2019-09-30 | End: 2019-09-30 | Stop reason: HOSPADM

## 2019-09-30 RX ORDER — MIDAZOLAM HYDROCHLORIDE 1 MG/ML
INJECTION, SOLUTION INTRAMUSCULAR; INTRAVENOUS AS NEEDED
Status: DISCONTINUED | OUTPATIENT
Start: 2019-09-30 | End: 2019-09-30 | Stop reason: HOSPADM

## 2019-09-30 RX ORDER — LIDOCAINE HYDROCHLORIDE 20 MG/ML
INJECTION, SOLUTION EPIDURAL; INFILTRATION; INTRACAUDAL; PERINEURAL AS NEEDED
Status: DISCONTINUED | OUTPATIENT
Start: 2019-09-30 | End: 2019-09-30 | Stop reason: HOSPADM

## 2019-09-30 RX ORDER — SODIUM CHLORIDE, SODIUM LACTATE, POTASSIUM CHLORIDE, CALCIUM CHLORIDE 600; 310; 30; 20 MG/100ML; MG/100ML; MG/100ML; MG/100ML
125 INJECTION, SOLUTION INTRAVENOUS CONTINUOUS
Status: CANCELLED | OUTPATIENT
Start: 2019-09-30

## 2019-09-30 RX ORDER — PHENYLEPHRINE HCL IN 0.9% NACL 0.4MG/10ML
SYRINGE (ML) INTRAVENOUS AS NEEDED
Status: DISCONTINUED | OUTPATIENT
Start: 2019-09-30 | End: 2019-09-30 | Stop reason: HOSPADM

## 2019-09-30 RX ORDER — MORPHINE SULFATE 10 MG/ML
2 INJECTION, SOLUTION INTRAMUSCULAR; INTRAVENOUS
Status: CANCELLED | OUTPATIENT
Start: 2019-09-30

## 2019-09-30 RX ORDER — FENTANYL CITRATE 50 UG/ML
50 INJECTION, SOLUTION INTRAMUSCULAR; INTRAVENOUS AS NEEDED
Status: DISCONTINUED | OUTPATIENT
Start: 2019-09-30 | End: 2019-09-30 | Stop reason: HOSPADM

## 2019-09-30 RX ORDER — SODIUM CHLORIDE 0.9 % (FLUSH) 0.9 %
5-40 SYRINGE (ML) INJECTION EVERY 8 HOURS
Status: DISCONTINUED | OUTPATIENT
Start: 2019-09-30 | End: 2019-09-30 | Stop reason: HOSPADM

## 2019-09-30 RX ORDER — SODIUM CHLORIDE, SODIUM LACTATE, POTASSIUM CHLORIDE, CALCIUM CHLORIDE 600; 310; 30; 20 MG/100ML; MG/100ML; MG/100ML; MG/100ML
125 INJECTION, SOLUTION INTRAVENOUS CONTINUOUS
Status: DISCONTINUED | OUTPATIENT
Start: 2019-09-30 | End: 2019-09-30 | Stop reason: HOSPADM

## 2019-09-30 RX ORDER — OXYCODONE AND ACETAMINOPHEN 5; 325 MG/1; MG/1
1 TABLET ORAL AS NEEDED
Status: CANCELLED | OUTPATIENT
Start: 2019-09-30

## 2019-09-30 RX ORDER — ONDANSETRON 2 MG/ML
INJECTION INTRAMUSCULAR; INTRAVENOUS AS NEEDED
Status: DISCONTINUED | OUTPATIENT
Start: 2019-09-30 | End: 2019-09-30 | Stop reason: HOSPADM

## 2019-09-30 RX ORDER — SODIUM CHLORIDE 9 MG/ML
25 INJECTION, SOLUTION INTRAVENOUS CONTINUOUS
Status: DISCONTINUED | OUTPATIENT
Start: 2019-09-30 | End: 2019-09-30 | Stop reason: HOSPADM

## 2019-09-30 RX ORDER — FENTANYL CITRATE 50 UG/ML
INJECTION, SOLUTION INTRAMUSCULAR; INTRAVENOUS AS NEEDED
Status: DISCONTINUED | OUTPATIENT
Start: 2019-09-30 | End: 2019-09-30 | Stop reason: HOSPADM

## 2019-09-30 RX ORDER — EPHEDRINE SULFATE/0.9% NACL/PF 50 MG/5 ML
SYRINGE (ML) INTRAVENOUS AS NEEDED
Status: DISCONTINUED | OUTPATIENT
Start: 2019-09-30 | End: 2019-09-30 | Stop reason: HOSPADM

## 2019-09-30 RX ORDER — MIDAZOLAM HYDROCHLORIDE 1 MG/ML
0.5 INJECTION, SOLUTION INTRAMUSCULAR; INTRAVENOUS
Status: CANCELLED | OUTPATIENT
Start: 2019-09-30

## 2019-09-30 RX ORDER — DIPHENHYDRAMINE HYDROCHLORIDE 50 MG/ML
12.5 INJECTION, SOLUTION INTRAMUSCULAR; INTRAVENOUS AS NEEDED
Status: CANCELLED | OUTPATIENT
Start: 2019-09-30 | End: 2019-09-30

## 2019-09-30 RX ORDER — DEXAMETHASONE SODIUM PHOSPHATE 4 MG/ML
INJECTION, SOLUTION INTRA-ARTICULAR; INTRALESIONAL; INTRAMUSCULAR; INTRAVENOUS; SOFT TISSUE AS NEEDED
Status: DISCONTINUED | OUTPATIENT
Start: 2019-09-30 | End: 2019-09-30 | Stop reason: HOSPADM

## 2019-09-30 RX ORDER — SODIUM CHLORIDE 0.9 % (FLUSH) 0.9 %
5-40 SYRINGE (ML) INJECTION AS NEEDED
Status: CANCELLED | OUTPATIENT
Start: 2019-09-30

## 2019-09-30 RX ORDER — SODIUM CHLORIDE 0.9 % (FLUSH) 0.9 %
5-40 SYRINGE (ML) INJECTION AS NEEDED
Status: DISCONTINUED | OUTPATIENT
Start: 2019-09-30 | End: 2019-09-30 | Stop reason: HOSPADM

## 2019-09-30 RX ORDER — SUCCINYLCHOLINE CHLORIDE 20 MG/ML
INJECTION INTRAMUSCULAR; INTRAVENOUS AS NEEDED
Status: DISCONTINUED | OUTPATIENT
Start: 2019-09-30 | End: 2019-09-30 | Stop reason: HOSPADM

## 2019-09-30 RX ORDER — BACITRACIN 500 [USP'U]/G
OINTMENT TOPICAL AS NEEDED
Status: DISCONTINUED | OUTPATIENT
Start: 2019-09-30 | End: 2019-09-30 | Stop reason: HOSPADM

## 2019-09-30 RX ORDER — HYDROCODONE BITARTRATE AND ACETAMINOPHEN 5; 325 MG/1; MG/1
1 TABLET ORAL
Qty: 20 TAB | Refills: 0 | Status: SHIPPED | OUTPATIENT
Start: 2019-09-30 | End: 2019-10-05

## 2019-09-30 RX ADMIN — SODIUM CHLORIDE, POTASSIUM CHLORIDE, SODIUM LACTATE AND CALCIUM CHLORIDE: 600; 310; 30; 20 INJECTION, SOLUTION INTRAVENOUS at 08:28

## 2019-09-30 RX ADMIN — SODIUM CHLORIDE, SODIUM LACTATE, POTASSIUM CHLORIDE, AND CALCIUM CHLORIDE 125 ML/HR: 600; 310; 30; 20 INJECTION, SOLUTION INTRAVENOUS at 08:22

## 2019-09-30 RX ADMIN — Medication 10 MG: at 09:30

## 2019-09-30 RX ADMIN — DEXAMETHASONE SODIUM PHOSPHATE 4 MG: 4 INJECTION, SOLUTION INTRAMUSCULAR; INTRAVENOUS at 08:43

## 2019-09-30 RX ADMIN — LIDOCAINE HYDROCHLORIDE 70 MG: 20 INJECTION, SOLUTION EPIDURAL; INFILTRATION; INTRACAUDAL; PERINEURAL at 08:34

## 2019-09-30 RX ADMIN — Medication 10 MG: at 09:42

## 2019-09-30 RX ADMIN — Medication 80 MCG: at 09:09

## 2019-09-30 RX ADMIN — Medication 40 MCG: at 09:13

## 2019-09-30 RX ADMIN — Medication 10 MG: at 09:17

## 2019-09-30 RX ADMIN — ROCURONIUM BROMIDE 5 MG: 10 SOLUTION INTRAVENOUS at 08:34

## 2019-09-30 RX ADMIN — ACETAMINOPHEN 650 MG: 325 TABLET, FILM COATED ORAL at 08:30

## 2019-09-30 RX ADMIN — PROPOFOL 50 MG: 10 INJECTION, EMULSION INTRAVENOUS at 08:59

## 2019-09-30 RX ADMIN — Medication 80 MCG: at 09:07

## 2019-09-30 RX ADMIN — CEFAZOLIN 2 G: 330 INJECTION, POWDER, FOR SOLUTION INTRAMUSCULAR; INTRAVENOUS at 08:45

## 2019-09-30 RX ADMIN — FENTANYL CITRATE 50 MCG: 50 INJECTION, SOLUTION INTRAMUSCULAR; INTRAVENOUS at 08:34

## 2019-09-30 RX ADMIN — SUCCINYLCHOLINE CHLORIDE 120 MG: 20 INJECTION, SOLUTION INTRAMUSCULAR; INTRAVENOUS at 08:34

## 2019-09-30 RX ADMIN — FENTANYL CITRATE 50 MCG: 50 INJECTION, SOLUTION INTRAMUSCULAR; INTRAVENOUS at 08:57

## 2019-09-30 RX ADMIN — ONDANSETRON HYDROCHLORIDE 4 MG: 2 INJECTION, SOLUTION INTRAVENOUS at 10:05

## 2019-09-30 RX ADMIN — MIDAZOLAM 2 MG: 1 INJECTION INTRAMUSCULAR; INTRAVENOUS at 08:28

## 2019-09-30 RX ADMIN — PROPOFOL 150 MG: 10 INJECTION, EMULSION INTRAVENOUS at 08:34

## 2019-09-30 RX ADMIN — HYDROMORPHONE HYDROCHLORIDE 0.5 MG: 2 INJECTION, SOLUTION INTRAMUSCULAR; INTRAVENOUS; SUBCUTANEOUS at 09:17

## 2019-09-30 NOTE — ANESTHESIA PREPROCEDURE EVALUATION
Anesthetic History   No history of anesthetic complications            Review of Systems / Medical History  Patient summary reviewed, nursing notes reviewed and pertinent labs reviewed    Pulmonary  Within defined limits                 Neuro/Psych   Within defined limits           Cardiovascular    Hypertension        Dysrhythmias : atrial fibrillation      Exercise tolerance: >4 METS     GI/Hepatic/Renal  Within defined limits              Endo/Other  Within defined limits           Other Findings              Physical Exam    Airway  Mallampati: II  TM Distance: > 6 cm  Neck ROM: normal range of motion   Mouth opening: Normal     Cardiovascular  Regular rate and rhythm,  S1 and S2 normal,  no murmur, click, rub, or gallop             Dental  No notable dental hx       Pulmonary  Breath sounds clear to auscultation               Abdominal  GI exam deferred       Other Findings            Anesthetic Plan    ASA: 2  Anesthesia type: general          Induction: Intravenous  Anesthetic plan and risks discussed with: Patient

## 2019-09-30 NOTE — ROUTINE PROCESS
Patient: Fernie Lim MRN: [id number]  SSN: [id number] YOB: 1945  Age: 69 y.o. Sex: male Patient is status post Procedure(s): EXCISION OF BASAL CELL CARCINOMA MID FOREHEAD WITH FROZEN SECTION AND ADJACENT TISSUE TRANSFER, EXCISION OF BASAL CELL CARCINOMA RIGHT FOREHEAD WITH FROZEN SECTION AND ADJACENT TISSUE TRANSFER, EXCISION OF LEFT SCALP SQUAMOUS CELL CARCINOMA WITH FROZEN SECTION AND PEDICLED FLAP CLOSURE; LEFT FOREHEAD LESION EXCISION WITH ADJACENT TISSUE TRANSFER. Surgeon(s) and Role: 
   * Sophia Hernandez MD - Primary Local/Dose/Irrigation:  SEE MAR Peripheral IV 09/30/19 Left;Posterior Hand (Active) Site Assessment Clean, dry, & intact 9/30/2019  8:18 AM  
Phlebitis Assessment 0 9/30/2019  8:18 AM  
Infiltration Assessment 0 9/30/2019  8:18 AM  
Dressing Status Clean, dry, & intact 9/30/2019  8:18 AM  
Dressing Type Transparent 9/30/2019  8:18 AM  
Hub Color/Line Status Pink 9/30/2019  8:18 AM  
Alcohol Cap Used Yes 9/30/2019  8:18 AM  
        
Airway - Endotracheal Tube 09/30/19 Oral (Active) Dressing/Packing:  Wound Head-Dressing Type: 4 x 4(TEGADERM) (09/30/19 1034) Splint/Cast:  ] Other:

## 2019-09-30 NOTE — BRIEF OP NOTE
BRIEF OPERATIVE NOTE    Date of Procedure: 9/30/2019   Preoperative Diagnosis: BASAL CELL MID FOREHEAD , BASAL CELL CARCINOMA RIGHT FOREHEAD , LEFT SCALP SQUAMOUS CELL CARCINOMA  Postoperative Diagnosis: BASAL CELL MID FOREHEAD , BASAL CELL CARCINOMA RIGHT FOREHEAD , LEFT SCALP SQUAMOUS CELL CARCINOMA    Procedure(s):  EXCISION OF BASAL CELL CARCINOMA MID FOREHEAD WITH FROZEN SECTION AND ADJACENT TISSUE TRANSFER, EXCISION OF BASAL CELL CARCINOMA RIGHT FOREHEAD WITH FROZEN SECTION AND ADJACENT TISSUE TRANSFER, EXCISION OF LEFT SCALP SQUAMOUS CELL CARCINOMA WITH FROZEN SECTION AND PEDICLED FLAP CLOSURE; LEFT FOREHEAD LESION EXCISION WITH ADJACENT TISSUE TRANSFER  Surgeon(s) and Role:     * Tone Hernandez MD - Primary         Surgical Assistant: Danyel Granda    Surgical Staff:  Circ-1: Wayne Mast RN  Circ-Relief: Patience Steiner RN  Registered Nurse Assistant: Josiah Smyth RN  Scrub RN-1: Belkis Weeks RN  Event Time In Time Out   Incision Start 4477    Incision Close 1033      Anesthesia: General   Estimated Blood Loss: minimal  Specimens:   ID Type Source Tests Collected by Time Destination   1 : Mid Forehead basal cell Frozen Section Forehead  Raquel Briseno MD 9/30/2019 8402 Pathology   2 : RIGHT FOREHEAD BASAL CELL CARCINOMA Frozen Section Forehead  Raquel Briseno MD 9/30/2019 7008 Pathology   3 : LEFT SCALP SQUAMOUS CELL Frozen Section Scalp  Raquel Briseno MD 9/30/2019 6805 Pathology   4 : RIGHT FOREHEAD BASAL CELL NEW LATERAL MARGIN Fresh Forehead  Raquel Briseno MD 9/30/2019 9105 Pathology   5 : LEFT FOREHEAD LESION Frozen Section Forehead  Raquel Briseno MD 9/30/2019 1004 Pathology   6 : MID FOREHEAD BASAL CELL NEW SUPERIOR MARGIN Fresh Forehead  Tone Hernandez MD 9/30/2019 1017 Pathology      Findings: none   Complications: none  Implants: * No implants in log *

## 2019-09-30 NOTE — ANESTHESIA POSTPROCEDURE EVALUATION
Procedure(s):  EXCISION OF BASAL CELL CARCINOMA MID FOREHEAD WITH FROZEN SECTION AND ADJACENT TISSUE TRANSFER, EXCISION OF BASAL CELL CARCINOMA RIGHT FOREHEAD WITH FROZEN SECTION AND ADJACENT TISSUE TRANSFER, EXCISION OF LEFT SCALP SQUAMOUS CELL CARCINOMA WITH FROZEN SECTION AND PEDICLED FLAP CLOSURE; LEFT FOREHEAD LESION EXCISION WITH ADJACENT TISSUE TRANSFER. general    Anesthesia Post Evaluation        Patient location during evaluation: PACU  Patient participation: complete - patient participated  Level of consciousness: awake  Pain management: adequate  Airway patency: patent  Anesthetic complications: no  Cardiovascular status: hemodynamically stable  Respiratory status: acceptable  Hydration status: acceptable  Comments: I have seen and evaluated the patient. The patient is ready for PACU discharge. [address], DO                         Vitals Value Taken Time   /67 9/30/2019 10:55 AM   Temp 36.2 °C (97.2 °F) 9/30/2019 10:47 AM   Pulse 70 9/30/2019 10:59 AM   Resp 12 9/30/2019 10:59 AM   SpO2 97 % 9/30/2019 10:59 AM   Vitals shown include unvalidated device data.

## 2019-09-30 NOTE — DISCHARGE INSTRUCTIONS
Bull Servin MD, Valentino Newcomer [facility]  [phone]    Minor Procedure post-operative instructions    You have had a minor surgical procedure. Please follow these simple instructions to help ensure a safe and comfortable recovery. Any questions can be directed to the office at [phone]. Bandages:  If bandages were placed, remove them 2 day(s) after surgery. If skin glue was used to cover your incisions, there might not be any bandages that require removal.    Expect a modest amount of redness (inflammation) and possibly some bruising to appear in the days following your surgery. This is normal and will resolve as the wound heals. The appearance of excessive wound redness, pus or fever is not normal and should be reported to the office. Bathing:  [ *** ] You may shower 2 day(s) after surgery. You may shampoo your hair and may use soap for your skin. No tub baths or swimming for 2 weeks. Remove any bandages before showering. If there is skin glue on your incision(s), it will fall off on its own. If it is still present after one week, you may scrub or peel it off. [ *** ]  Antibiotic ointment (such as bacitracin, vaseline) should be lightly applied 2-3 times per day to all incisions. If the incision is near the eye, you may be given an ophthalmic ointment to use. [ *** ]  Suture removal: You have sutures that need to be removed. Please call to schedule and appointment in 10 days. Pain:  Mild to moderate pain is to be expected after minor surgery and will generally be relieved by the use of Tylenol or ibuprofen agents (Advil, Motrin, Nuprin, etc.) You have been given a prescription for lortab. Swelling or discomfort will be improved by elevating the surgical site above the level of the heart, especially the face, scalp or arms, which can be elevated in bed by extra pillows.     Activity:  Please observe the following restrictions until you are cleared by your surgeon. [ *** ]  No heavy lifting greater than 10 pounds or strenuous activity. [ *** ]  Other:  ____________________    Follow-up appointment: please call the office at [phone] during regular business hours to schedule an appointment in ***. DISCHARGE SUMMARY from Nurse    PATIENT INSTRUCTIONS:    After general anesthesia or intravenous sedation, for 24 hours or while taking prescription Narcotics:  · Limit your activities  · Do not drive and operate hazardous machinery  · Do not make important personal or business decisions  · Do  not drink alcoholic beverages  · If you have not urinated within 8 hours after discharge, please contact your surgeon on call. Report the following to your surgeon:  · Excessive pain, swelling, redness or odor of or around the surgical area  · Temperature over 100.5  · Nausea and vomiting lasting longer than 4 hours or if unable to take medications  · Any signs of decreased circulation or nerve impairment to extremity: change in color, persistent  numbness, tingling, coldness or increase pain  · Any questions    What to do at Home:  Recommended activity: See surgical instructions. If you experience any of the following symptoms as noted above, please follow up with Dr. Tobi Jett. *  Please give a list of your current medications to your Primary Care Provider. *  Please update this list whenever your medications are discontinued, doses are      changed, or new medications (including over-the-counter products) are added. *  Please carry medication information at all times in case of emergency situations. These are general instructions for a healthy lifestyle:    No smoking/ No tobacco products/ Avoid exposure to second hand smoke  Surgeon General's Warning:  Quitting smoking now greatly reduces serious risk to your health.     Obesity, smoking, and sedentary lifestyle greatly increases your risk for illness    A healthy diet, regular physical exercise & weight monitoring are important for maintaining a healthy lifestyle    You may be retaining fluid if you have a history of heart failure or if you experience any of the following symptoms:  Weight gain of 3 pounds or more overnight or 5 pounds in a week, increased swelling in our hands or feet or shortness of breath while lying flat in bed. Please call your doctor as soon as you notice any of these symptoms; do not wait until your next office visit. The discharge information has been reviewed with the caregiver. The caregiver verbalized understanding. Discharge medications reviewed with the caregiver and appropriate educational materials and side effects teaching were provided.   ___________________________________________________________________________________________________________________________________

## 2019-09-30 NOTE — H&P
Pre-op History and Physical    CC: BASAL CELL MID FOREHEAD , BASAL CELL CARCINOMA RIGHT FOREHEAD , LEFT SCALP SQUAMOUS CELL CARCINOMA   HPI: 69y.o. year old male with BASAL CELL MID FOREHEAD , BASAL CELL CARCINOMA RIGHT FOREHEAD , LEFT SCALP SQUAMOUS CELL CARCINOMA for Procedure(s):  EXCISION OF BASAL CELL CARCINOMA MID FOREHEAD WITH FROZEN SECTION AND ADJACENT TISSUE TRANSFER, EXCISION OF BASAL CELL CARCINOMA RIGHT FOREHEAD WITH FROZEN SECTION AND ADJACENT TISSUE TRANSFER, EXCISION OF LEFT SCALP SQUAMOUS CELL CARCINOMA WITH FROZEN SECTION AND PEDICLED FLAP CLOSURE VS FULL THICKNESS SKIN GRAFT. Ludy Trimble   Past medical history:   Past Medical History:   Diagnosis Date    Arthritis     Basal cell carcinoma of skin of scalp and neck     [facility]    Cancer [facility]) 2002    Basal Cancer /head    Chronic pain     Dyslexia     Hypertension     Paroxysmal A-fib ([address]     Squamous cell carcinoma 2019    FOREHEAD    Varicocele       Past surgical history:   Past Surgical History:   Procedure Laterality Date    COLONOSCOPY N/A 8/15/2017    COLONOSCOPY performed by Eros Sosa MD at [address] [facility]      colonoscopy x 2 - last 2012    HX HEENT  2013    BCCA ,HEAD ,NECK     HX OTHER SURGICAL      BCCAs removed - several times      Family history:   Family History   Problem Relation Age of Onset    Arthritis-osteo Mother     Arrhythmia Mother     Other Mother         brain hemorrhage    Other Father         PUD - removed part of stomach/\"cauterized stomach\"    Liver Disease Father         hepatitis due to blood transfusion    Heart Attack Father         during procedure or surgery    Liver Disease Brother         hepatitis from meat    Dementia Brother     Heart Attack Brother     Stroke Brother     Other Brother         \"cauterized stomach\"    Depression Brother     Anesth Problems Neg Hx       Social history:   Social History     Socioeconomic History    Marital status: SINGLE     Spouse name: Not on file    Number of children: Not on file    Years of education: Not on file    Highest education level: Not on file   Occupational History    Not on file   Social Needs    Financial resource strain: Not on file    Food insecurity:     Worry: Not on file     Inability: Not on file    Transportation needs:     Medical: Not on file     Non-medical: Not on file   Tobacco Use    Smoking status: Former Smoker     Packs/day: 0.25     Years: 3.00     Pack years: 0.75     Last attempt to quit: 1980     Years since quittin.7    Smokeless tobacco: Never Used    Tobacco comment: smoked 25 yr - 25 yrs old   Substance and Sexual Activity    Alcohol use: No     Alcohol/week: 0.0 standard drinks    Drug use: No    Sexual activity: Not on file   Lifestyle    Physical activity:     Days per week: Not on file     Minutes per session: Not on file    Stress: Not on file   Relationships    Social connections:     Talks on phone: Not on file     Gets together: Not on file     Attends [employer] service: Not on file     Active member of club or organization: Not on file     Attends meetings of clubs or organizations: Not on file     Relationship status: Not on file    Intimate partner violence:     Fear of current or ex partner: Not on file     Emotionally abused: Not on file     Physically abused: Not on file     Forced sexual activity: Not on file   Other Topics Concern    Not on file   Social History Narrative    Not on file      Home Medications:   Prior to Admission medications    Medication Sig Start Date End Date Taking? Authorizing Provider   metoprolol succinate (TOPROL XL) 25 mg XL tablet Take 1 Tab by mouth daily. Patient taking differently: Take 25 mg by mouth daily (after breakfast). 18   Elizabeth Mcgrath MD   ASPIRIN PO Take 81 mg by mouth daily. Provider, Historical   fluorouracil (EFUDEX) 5 % chemo cream Apply  to affected area as needed.     Provider, Historical      Allergies: No Known Allergies   Review of systems:  Denies headache, fever, chills, weight change, congestion, sore throat, chest pain, shortness of breath, nausea, vomiting, diarrhea, constipation, abdominal pain, generalized weakness, muscle or joint pain, and rash. Physical Exam:  Vitals: There were no vitals taken for this visit. General: awake and alert, NAD  Neck: supple  Cor: RRR  Lungs: clear  Abdomen: soft, non-tender, non-distended  Extremities: no edema  Skin: no rash    Impression: BASAL CELL MID FOREHEAD , BASAL CELL CARCINOMA RIGHT FOREHEAD , LEFT SCALP SQUAMOUS CELL CARCINOMA    Plan:  Procedure(s):  EXCISION OF BASAL CELL CARCINOMA MID FOREHEAD WITH FROZEN SECTION AND ADJACENT TISSUE TRANSFER, EXCISION OF BASAL CELL CARCINOMA RIGHT FOREHEAD WITH FROZEN SECTION AND ADJACENT TISSUE TRANSFER, EXCISION OF LEFT SCALP SQUAMOUS CELL CARCINOMA WITH FROZEN SECTION AND PEDICLED FLAP CLOSURE VS FULL THICKNESS SKIN GRAFT. Pre-op History and Physical    CC: BASAL CELL MID FOREHEAD , BASAL CELL CARCINOMA RIGHT FOREHEAD , LEFT SCALP SQUAMOUS CELL CARCINOMA   HPI: 69y.o. year old male with BASAL CELL MID FOREHEAD , BASAL CELL CARCINOMA RIGHT FOREHEAD , LEFT SCALP SQUAMOUS CELL CARCINOMA for Procedure(s):  EXCISION OF BASAL CELL CARCINOMA MID FOREHEAD WITH FROZEN SECTION AND ADJACENT TISSUE TRANSFER, EXCISION OF BASAL CELL CARCINOMA RIGHT FOREHEAD WITH FROZEN SECTION AND ADJACENT TISSUE TRANSFER, EXCISION OF LEFT SCALP SQUAMOUS CELL CARCINOMA WITH FROZEN SECTION AND PEDICLED FLAP CLOSURE VS FULL THICKNESS SKIN GRAFT. Jossie Lloyd   Past medical history:   Past Medical History:   Diagnosis Date    Arthritis     [facility]al cell carcinoma of skin of scalp and neck     [facility]    Cancer [facility]) 2002    Basal Cancer /head    Chronic pain     Dyslexia     Hypertension     Paroxysmal A-fib ([address]     Squamous cell carcinoma 2019    FOREHEAD    Varicocele       Past surgical history:   Past Surgical History:   Procedure Laterality Date  COLONOSCOPY N/A 8/15/2017    COLONOSCOPY performed by Kimi Mathews MD at [address] [facility]      colonoscopy x 2 - last     HX HEENT      BCCA ,HEAD ,NECK     HX OTHER SURGICAL      BCCAs removed - several times      Family history:   Family History   Problem Relation Age of Onset    Arthritis-osteo Mother     Arrhythmia Mother     Other Mother         brain hemorrhage    Other Father         PUD - removed part of stomach/\"cauterized stomach\"    Liver Disease Father         hepatitis due to blood transfusion    Heart Attack Father         during procedure or surgery    Liver Disease Brother         hepatitis from meat    Dementia Brother     Heart Attack Brother     Stroke Brother     Other Brother         \"cauterized stomach\"    Depression Brother     Anesth Problems Neg Hx       Social history:   Social History     Socioeconomic History    Marital status: SINGLE     Spouse name: Not on file    Number of children: Not on file    Years of education: Not on file    Highest education level: Not on file   Occupational History    Not on file   Social Needs    Financial resource strain: Not on file    Food insecurity:     Worry: Not on file     Inability: Not on file    Transportation needs:     Medical: Not on file     Non-medical: Not on file   Tobacco Use    Smoking status: Former Smoker     Packs/day: 0.25     Years: 3.00     Pack years: 0.75     Last attempt to quit: 1980     Years since quittin.7    Smokeless tobacco: Never Used    Tobacco comment: smoked 25 yr - 25 yrs old   Substance and Sexual Activity    Alcohol use: No     Alcohol/week: 0.0 standard drinks    Drug use: No    Sexual activity: Not on file   Lifestyle    Physical activity:     Days per week: Not on file     Minutes per session: Not on file    Stress: Not on file   Relationships    Social connections:     Talks on phone: Not on file     Gets together: Not on file     Attends [employer] service: Not on file     Active member of club or organization: Not on file     Attends meetings of clubs or organizations: Not on file     Relationship status: Not on file    Intimate partner violence:     Fear of current or ex partner: Not on file     Emotionally abused: Not on file     Physically abused: Not on file     Forced sexual activity: Not on file   Other Topics Concern    Not on file   Social History Narrative    Not on file      Home Medications:   Prior to Admission medications    Medication Sig Start Date End Date Taking? Authorizing Provider   metoprolol succinate (TOPROL XL) 25 mg XL tablet Take 1 Tab by mouth daily. Patient taking differently: Take 25 mg by mouth daily (after breakfast). 5/27/18   Sandi Gamez MD   ASPIRIN PO Take 81 mg by mouth daily. Provider, Historical   fluorouracil (EFUDEX) 5 % chemo cream Apply  to affected area as needed. Provider, Historical      Allergies: No Known Allergies   Review of systems:  Denies headache, fever, chills, weight change, congestion, sore throat, chest pain, shortness of breath, nausea, vomiting, diarrhea, constipation, abdominal pain, generalized weakness, muscle or joint pain, and rash. Physical Exam:  Vitals: There were no vitals taken for this visit. General: awake and alert, NAD  Neck: supple  Cor: RRR  Lungs: clear  Abdomen: soft, non-tender, non-distended  Extremities: no edema  Skin: no rash    Impression: BASAL CELL MID FOREHEAD , BASAL CELL CARCINOMA RIGHT FOREHEAD , LEFT SCALP SQUAMOUS CELL CARCINOMA    Plan:  Procedure(s):  EXCISION OF BASAL CELL CARCINOMA MID FOREHEAD WITH FROZEN SECTION AND ADJACENT TISSUE TRANSFER, EXCISION OF BASAL CELL CARCINOMA RIGHT FOREHEAD WITH FROZEN SECTION AND ADJACENT TISSUE TRANSFER, EXCISION OF LEFT SCALP SQUAMOUS CELL CARCINOMA WITH FROZEN SECTION AND PEDICLED FLAP CLOSURE VS FULL THICKNESS SKIN GRAFT.

## 2019-10-01 NOTE — OP NOTES
[facility]  OPERATIVE REPORT    Name:  Juliet Evans  MR#:  [id number]  :  1945  ACCOUNT #:  [de-identified]  DATE OF SERVICE:  2019      PREOPERATIVE DIAGNOSES:  1.  Mid forehead basal cell carcinoma. 2.  Right forehead basal cell carcinoma. 3.  Left scalp squamous cell carcinoma. 4.  Left forehead lesion. POSTOPERATIVE DIAGNOSES:  1.  Mid forehead basal cell carcinoma. 2.  Right forehead basal cell carcinoma. 3.  Left scalp squamous cell carcinoma. 4.  Left forehead lesion. PROCEDURES PERFORMED:  1. Wide local excision of mid forehead basal cell carcinoma with frozen section and adjacent tissue transfer, measuring 5 x 5 cm for a total of 25 sq cm. 2.  Excision of right forehead basal cell carcinoma with frozen section and adjacent tissue transfer, measuring 5 x 5 cm for a total of 25 sq cm. 3.  Excision of left scalp squamous cell carcinoma with frozen section, measuring 5 cm with pedicled flap closure of the branches of the temporal artery. 4.  Excision of left forehead lesion with frozen section with adjacent tissue transfer, measuring 4 x 4 cm for a total of 16 sq cm. SURGEON:  Jackie Lewis MD    ASSISTANT:  Hector Ambriz.    ANESTHESIA:  General.    COMPLICATIONS:  None. SPECIMENS REMOVED:  1. Mid forehead basal cell carcinoma. 2.  Superior margin of the mid forehead basal cell carcinoma. 3.  Right forehead basal cell carcinoma. 4.  Right lateral margin of basal cell carcinoma. 5.  Left scalp squamous cell. 6.  Left forehead lesion. IMPLANTS:  None. ESTIMATED BLOOD LOSS:  Minimal.    DRAINS:  None. INDICATIONS FOR SURGERY:  The patient is a 32-year-old man who has a history of previous skin cancers in the past.  He is here because he has biopsy-proven basal cell carcinoma of the mid and right forehead as well as the left scalp squamous cell.   He is here for wide local excision of these areas in addition to an area on the left brow, that is unknown neoplasm. The patient was marked in the preoperative holding area. He signed informed consent. He was taken to the operating room, placed on the operating room table in supine position. He was placed under general anesthesia without complication. A time-out was performed in order to verify the patient's identity and surgical sites which were both correct. He was given preoperative antibiotics which consisted of IV Ancef. He was injected with local anesthesia which consisted of 1% lidocaine with epinephrine. An incision was made using #15C blade, all of the areas were excised with new blade at each time. They were marked in the superior aspect and sent to Pathology for frozen section. The left scalp was marked in the posterior aspect. Frozen section returned that the mid forehead area had approximated at the margin at the superior aspect. This area was re-excised and sent to Pathology for permanent section. The right forehead lesion frozen section returned as clear except it was very close to the lateral margin, and this was re-excised and sent to Pathology for permanent section. The left scalp squamous cell was clear at frozen section and the left forehead lesion proved to be biopsy site changes with no tumor seen. In the brow and forehead, these areas were elevated using tenotomy scissors and skin hooks widely and then closed after the adjacent tissues were transferred into the surgical defect. This was done in order to obtain a tension-free closure. Attempts were made to be aggressive with this technique in order to avoid full-thickness skin graft on the patient's forehead. At this point, the attention was turned to the scalp. Elevation was done based off the branches of the temporal artery. This was elevated using tenotomy scissors and skin hooks.   This was then flapped into the area, where the defect was created and the deep tissues were closed using buried interrupted 3-0 Vicryl and the skin was closed using surgical staples as there was some tension in this area. At the conclusion of the case, the was blanching and there was no areas of increased tension. The dressing consisted of bacitracin ointment, 4 x 4s, and Tegaderm. The patient was then extubated and transferred to the PACU in stable condition. There were no complications during the procedure. The patient tolerated the procedure without complication. The instrument, sponge, and needle count were correct at the conclusion of the case.       Lonnie Young MD      [id number]  D:  09/30/2019 22:17  T:  10/01/2019 5:44  JOB #:  [id number]

## 2019-11-22 NOTE — ROUTINE PROCESS
Chief Complaint   Patient presents with   • Physical       SUBJECTIVE:  Anamika Mckenna is a 51 year old  female seen today for a physical.  Other concerns: .    Patient is still falling pulmonology and currently on long-term antibiotics and steroid.  He reporting cough symptoms which is still lingering.  She is using an inhaler.      DIET: Follows regular diet    EXERCISE: not regular.       OB History    Para Term  AB Living   0 0 0 0 0 0   SAB TAB Ectopic Molar Multiple Live Births   0 0 0 0 0 0       Immunization History   Administered Date(s) Administered   • Influenza, Split 10/23/2008   • Influenza, injectable, quadrivalent 10/05/2016   • Influenza, injectable, quadrivalent, preservative-free 2017, 10/18/2018, 10/24/2019   • Measles 1973   • Pneumococcal polysaccharide, adult, 23 valent 2017   • Rubella 1973   • Tdap 2007, 2017       Health Maintenance Summary     Shingles Vaccine (1 of 2)  Overdue since 2019    Breast Cancer Screening (Yearly)  Scheduled for 2020    Depression Screening (Yearly)  Next due on 2020    DTaP/Tdap/Td Vaccine (3 - Td)  Next due on 2027    Colorectal Cancer Screening-Colonoscopy (Every 10 Years)  Next due on 2029    Pneumococcal Vaccine 0-64   Aged Out    Influenza Vaccine   Completed    Meningococcal Vaccine   Aged Out          Patient Active Problem List    Diagnosis Date Noted   • Allergic rhinitis due to dust mite 2017     Priority: Medium   • Allergic rhinitis due to animal (cat) (dog) hair and dander 2017     Priority: Medium   • Lumbago 2018     Priority: Low   • Fibromyalgia 2018     Priority: Low   • Chronic neck pain 2018     Priority: Low   • Seasonal allergic rhinitis due to pollen 2017     Priority: Low   • Chronic allergic conjunctivitis 2017     Priority: Low   • Moderate persistent asthma without complication 2017     Priority: Low   •  Mohan Caro  1945  [id number]    Situation:  Verbal report received from: Daniel Diaz, RN  Procedure: Procedure(s):  COLONOSCOPY    Background:    Preoperative diagnosis: HX OF POLYPS  Postoperative diagnosis: Internal hemorrhoids    :  Dr. Mary Ann Klein  Assistant(s): Endoscopy Technician-1: Shea Wiley  Endoscopy RN-1: Lio Paredes RN    Specimens: * No specimens in log *  H. Pylori  no    Assessment:  Intra-procedure medications     Anesthesia gave intra-procedure sedation and medications, see anesthesia flow sheet yes    Intravenous fluids: NS@ KVO     Vital signs stable     Abdominal assessment: round and soft     Recommendation:  Discharge patient per MD order.     Family or Friend   Permission to share finding with family or friend yes Tobacco use disorder      Priority: Low   • Intermittent asthma      Priority: Low   • Family history of diabetes mellitus 08/16/2016     Priority: Low     Paternal aunt maternal grandmother     • Family history of breast cancer in female 08/16/2016     Priority: Low     Paternal aunt     • History of laparoscopic-assisted vaginal hysterectomy 08/09/2016     Priority: Low      Has ovaries yet          ALLERGIES:  No Known Allergies    Outpatient Medications Marked as Taking for the 11/22/19 encounter (Office Visit) with Andreina Brand MD   Medication Sig Dispense Refill   • albuterol (PROAIR HFA) 108 (90 Base) MCG/ACT inhaler Inhale 2 puffs into the lungs every 4 hours as needed for Shortness of Breath or Wheezing. Due for annual physical, please call our clinic to schedule an appointment. 8.5 g 0   • budesonide-formoterol (SYMBICORT) 80-4.5 MCG/ACT inhaler Inhale 2 puffs into the lungs 2 times daily. 10.2 g 12   • pantoprazole (PROTONIX) 40 MG tablet Take 1 tablet by mouth daily. 30 tablet 1   • sulfamethoxazole-trimethoprim (BACTRIM DS) 800-160 MG per tablet Take one tablet by mouth every Monday, Wednesday, Friday 12 tablet 1   • predniSONE (DELTASONE) 20 MG tablet Take 2.5 tablets by mouth daily. 75 tablet 1   • pregabalin (LYRICA) 150 MG capsule Take 1 capsule by mouth 2 times daily. 60 capsule 1   • DULoxetine (CYMBALTA) 60 MG capsule Take 1 capsule by mouth 2 times daily. 60 capsule 1   • pregabalin (LYRICA) 50 MG capsule Take one capsule by mouth in the morning and two capsules at bedtime. 90 capsule 0   • cetirizine-pseudoephedrine (ZYRTEC-D) 5-120 MG per tablet Take 1 tablet by mouth 2 times daily. 30 tablet 4   • Vitamin D, Ergocalciferol, 38399 units capsule Take 1 capsule by mouth 1 day a week. 12 capsule 3   • ipratropium (ATROVENT) 0.06 % nasal spray Spray 2 sprays in each nostril 3 times daily. 15 mL 3   • fluticasone (FLONASE) 50 MCG/ACT nasal spray Spray 2 sprays in each nostril daily. 1 Bottle  5        Past Medical History:   Diagnosis Date   • Chronic pain    • COPD (chronic obstructive pulmonary disease) (CMS/HCC)    • Depression    • Fibromyalgia 2018   • Intermittent asthma    • Tobacco use disorder        Past Surgical History:   Procedure Laterality Date   • Appendectomy     • Breast lumpectomy Right 2017    Radial sclerosing lesion and adenosis with associated microcalcifications   • Colonoscopy  2017    Dr. Albright. Repeat in 10 years.   • Colonoscopy  2019    repeat in 10 yrs   • Esophagogastroduodenoscopy  2017    Dr. Albright. Normal biopsies.   • Hysterectomy     • Laparoscopic hysterectomy  2016    Single Incision Laparoscopic Hysterectomy--(ovaries remain)   • Rotator cuff repair  3/2016    Right   • Salpingectomy  2016    Bilateral Salpingectomy   • Unlisted laparoscopy procedure   or        Family History   Problem Relation Age of Onset   • Macular degeneration Mother    • Heart disease Father 55        did smoke cigars   • Hypertension Father    • Arthritis Maternal Aunt    • Cancer, Breast Maternal Grandmother    • Diabetes Maternal Grandfather    • Cancer, Prostate Paternal Grandfather    • Cancer, Breast Paternal Aunt    • Cancer, Breast Paternal Aunt    • Diabetes Paternal Aunt    • Cancer Paternal Aunt         breast   • Other Sister         Ovarian Cysts    • Hypertension Brother    • Sleep Apnea Maternal Uncle        Social History     Tobacco Use   • Smoking status: Former Smoker     Packs/day: 0.25     Years: 33.00     Pack years: 8.25     Types: Cigarettes     Start date:      Last attempt to quit: 2019     Years since quittin.6   • Smokeless tobacco: Never Used   • Tobacco comment: 19: currently taking chantix   Substance Use Topics   • Alcohol use: Yes     Comment: 6 beers/week   • Drug use: No     Comment: Coffee 0-1 cup per day         REVIEW OF SYSTEMS:  GENERAL: Denies fever, chills, or appetite changes.    SKIN:  Denies any concerning lesions. No hair or nail concerns.  No bruising.  EYES: Denies visual problems.  ENT: Denies hearing concerns, sinus congestion, rhinorrhea.  CARDIOVASCULAR:  Denies chest pain, palpitations and peripheral edema.  RESPIRATORY:  Denies shortness of breath, wheezing. Positive for cough.  BREASTS: Denies lumps, pain, skin changes or nipple discharge.  GI: Denies abdominal pain, nausea, vomiting, diarrhea, constipation.  : Denies dysuria, frequency, urgency, bleeding or incontinence.    GYN: Denies vaginal discharge.  No history of sexually transmitted infections or concerns.   MUSCULOSKELETAL: Denies any joint or muscle pain.   NEUROLOGIC: Denies headache, dizziness, numbness, seizure or memory problems.    OBJECTIVE:  VITAL SIGNS:   Visit Vitals  /78   Pulse 100   Resp 24   Ht 5' 3\" (1.6 m)   Wt 65.7 kg   LMP 07/08/2016   BMI 25.65 kg/m²     GENERAL: Alert, cooperative, pleasant female in no acute distress.  SKIN: Warm and dry.  Normal turgor.  No rashes or lesions noted.  EYES: Normal lids and lashes.  No conjunctivitis.  Sclera anicteric.   HENT: Head: Normocephalic, atraumatic.  Ears: TM's normal bilaterally.  External canals open. Hearing appropriate to conversation. Nose: Bilateral nares patent.  No drainage. Mouth: Mucosa pink and moist. No pharyngeal erythema or tonsillar exudate.   NECK: Soft, supple and no cervical or supraclavicular lymphadenopathy.  No thyromegaly.  CARDIOVASCULAR: Regular rate, normal S1, S2.  No murmur gallop or rub.  No peripheral edema.  RESPIRATORY: Lungs clear to auscultation.  No wheezes, rhonchi or crackles.  BREASTS: Soft and nontender to palpation.  No masses, skin dimpling or nipple discharge. No axillary lymphadenopathy.  GI/: Abdomen soft, nontender, nondistended, normoactive bowel sounds. No hepatosplenomegaly or palpable masses.  Bladder nonpalpable.     MUSCULOSKELETAL: Upper and lower extremities symmetric with equal strength bilaterally. No  joint deformities.  Spine with normal curvature.  NEUROLOGIC: Oriented X3.  Cranial nerves intact.  Sensation grossly intact throughout.  PSYCHOLOGIC: Good eye contact, thoughts connected.  Dress and appearance appropriate.  Mood and affect normal and congruent.      ASSESSMENT: PLAN    Normal physical exam      Up to date on mammogram/colonoscopy.   Counseled regarding substance abuse and STD.   Discussed life style modification .   30 min of moderate exercise, 5 times per week or 45 min of intensive exercise 3 times per week along with strengthening exercise.  Discussed  calcium ( 1200 mg ) and  Vitamin D (800 IU, min ) daily   Discussed regular eye exam, dental visits. Self skin checks. SPF > 50 .   Self breast exams.   Immunizations as per nurse notes.       Over the last 2 weeks, how often have you been bothered by the following problems?          PHQ2 Score:  1  1. Little interest or pleasure in doing things?:  1  2. Feeling down, depressed, or hopeless?:  0     PHQ9 Score:  9  3. Trouble falling, staying asleep or sleeping too much?:  3  4. Feeling tired or having little energy?:  2  5. Poor appetite or overeating?:  2  6. Feeling bad about yourself - or that you are a failure or that you have let yourself or your family down?:  0  7. Trouble concentrating on things such as reading a newspaper or watching television?:  1  8. Moving or speaking so slowly that other people could have noticed? Or the opposite - being so fidgety or restless that you were moving around a lot more than usual?:  0  9. Thoughts that you would be better off dead, or of hurting yourself in some way?:  0         DEPRESSION ASSESSMENT/PLAN:  Depression screening is negative no further plan needed.  The    2. Hyperthyroidism    Adding labs today for her suppressed TSH.  Also ordering an ultrasound. .  - US THYROID ONLY; Future  - TSH RECEPTOR ANTIBODY; Future  - MICROSOMAL AB; Future  - THYROGLOBULIN ANTIBODY; Future    3. Moderate  persistent asthma without complication    Patient has quit smoking, currently following pulmonology    4. Cough    Patient requested codeine cough syrup which was prescribed today.  She will continue with the current regimen and follow up pulmonology.    5. Fibromyalgia    Continue on lyrica and Cymbalta.

## 2021-02-09 ENCOUNTER — TRANSCRIBE ORDER (OUTPATIENT)
Dept: GENERAL RADIOLOGY | Age: 76
End: 2021-02-09

## 2021-02-09 ENCOUNTER — HOSPITAL ENCOUNTER (OUTPATIENT)
Dept: GENERAL RADIOLOGY | Age: 76
Discharge: HOME | End: 2021-02-09
Attending: DERMATOLOGY
Payer: MEDICARE

## 2021-02-09 DIAGNOSIS — R20.0 TACTILE ANESTHESIA: ICD-10-CM

## 2021-02-09 DIAGNOSIS — R20.0 TACTILE ANESTHESIA: Primary | ICD-10-CM

## 2021-02-09 DIAGNOSIS — M25.50 PAIN IN JOINT, MULTIPLE SITES: ICD-10-CM

## 2021-02-09 PROCEDURE — 72050 X-RAY EXAM NECK SPINE 4/5VWS: CPT

## (undated) DEVICE — SPONGE GZ W4XL4IN COT RADPQ HIGHLY ABSRB

## (undated) DEVICE — BAG BELONG PT PERS CLEAR HANDL

## (undated) DEVICE — BIPOLAR FORCEPS CORD: Brand: VALLEYLAB

## (undated) DEVICE — SUTURE CHROMIC GUT SZ 5-0 L18IN ABSRB BRN P-3 L13MM 3/8 CIR 687G

## (undated) DEVICE — HANDLE LT SNAP ON ULT DURABLE LENS FOR TRUMPF ALC DISPOSABLE

## (undated) DEVICE — Z DISCONTINUED NO SUB IDED GLOVE SURG SZ 6 L12IN FNGR THK13MIL WHT ISOLEX POLYISOPRENE

## (undated) DEVICE — KIT IV STRT W CHLORAPREP PD 1ML

## (undated) DEVICE — SPONGE GZ W4XL4IN COT 12 PLY TYP VII WVN C FLD DSGN

## (undated) DEVICE — AIRLIFE™ U/CONNECT-IT OXYGEN TUBING 7 FEET (2.1 M) CRUSH-RESISTANT OXYGEN TUBING, VINYL TIPPED: Brand: AIRLIFE™

## (undated) DEVICE — QUILTED PREMIUM COMFORT UNDERPAD,EXTRA HEAVY: Brand: WINGS

## (undated) DEVICE — APPLICATOR COT-TIP 6IN WOOD -- 2/PK STRL

## (undated) DEVICE — KENDALL RADIOLUCENT FOAM MONITORING ELECTRODE -RECTANGULAR SHAPE: Brand: KENDALL

## (undated) DEVICE — REM POLYHESIVE ADULT PATIENT RETURN ELECTRODE: Brand: VALLEYLAB

## (undated) DEVICE — STRIP,CLOSURE,WOUND,MEDI-STRIP,1/2X4: Brand: MEDLINE

## (undated) DEVICE — SUTURE VCRL SZ 3-0 L27IN ABSRB UD L26MM SH 1/2 CIR J416H

## (undated) DEVICE — SET ADMIN 16ML TBNG L100IN 2 Y INJ SITE IV PIGGY BK DISP

## (undated) DEVICE — NEONATAL-ADULT SPO2 SENSOR: Brand: NELLCOR

## (undated) DEVICE — SYRINGE MED L0.5IN OD30GA 1ML LL W/ SFTY PIVOTING SHLD

## (undated) DEVICE — ENDO CARRY-ON PROCEDURE KIT INCLUDES ENZYMATIC SPONGE, GAUZE, BIOHAZARD LABEL, TRAY, LUBRICANT, DIRTY SCOPE LABEL, WATER LABEL, TRAY, DRAWSTRING PAD, AND DEFENDO 4-PIECE KIT.: Brand: ENDO CARRY-ON PROCEDURE KIT

## (undated) DEVICE — CATH IV AUTOGRD BC BLU 22GA 25 -- INSYTE

## (undated) DEVICE — INSULATED NEEDLE ELECTRODE: Brand: EDGE

## (undated) DEVICE — SYRINGE MED 20ML STD CLR PLAS LUERLOCK TIP N CTRL DISP

## (undated) DEVICE — NEEDLE HYPO 18GA L1.5IN PNK S STL HUB POLYPR SHLD REG BVL

## (undated) DEVICE — GARMENT,MEDLINE,DVT,INT,CALF,MED, GEN2: Brand: MEDLINE

## (undated) DEVICE — INTENDED FOR TISSUE SEPARATION, AND OTHER PROCEDURES THAT REQUIRE A SHARP SURGICAL BLADE TO PUNCTURE OR CUT.: Brand: BARD-PARKER ® CARBON RIB-BACK BLADES

## (undated) DEVICE — SET EXTN TBNG L BOR 4 W STPCOCK ST 32IN PRIMING VOL 6ML

## (undated) DEVICE — INFECTION CONTROL KIT SYS

## (undated) DEVICE — PACK PROCEDURE SURG ENT CUST

## (undated) DEVICE — SYR 3ML LL TIP 1/10ML GRAD --

## (undated) DEVICE — SUTURE PROL SZ 4-0 L18IN NONABSORBABLE BLU L13MM P-3 3/8 8699G

## (undated) DEVICE — 1200 GUARD II KIT W/5MM TUBE W/O VAC TUBE: Brand: GUARDIAN

## (undated) DEVICE — SUTURE ABSORBABLE BRAIDED 4-0 P-3 18 IN UD VICRYL J494G

## (undated) DEVICE — Device

## (undated) DEVICE — BW-412T DISP COMBO CLEANING BRUSH: Brand: SINGLE USE COMBINATION CLEANING BRUSH

## (undated) DEVICE — TRAY PREP DRY W/ PREM GLV 2 APPL 6 SPNG 2 UNDPD 1 OVERWRAP

## (undated) DEVICE — SWAB SURG L76CM COT ROUNDED PT TIP VISISWAB

## (undated) DEVICE — SOLIDIFIER FLUID 3000 CC ABSORB

## (undated) DEVICE — CONNECTOR TBNG AUX H2O JET DISP FOR OLY 160/180 SER

## (undated) DEVICE — Z DISCONTINUED USE 2751540 TUBING IRRIG L10IN DISP PMP ENDOGATOR

## (undated) DEVICE — SOLUTION IV 1000ML 0.9% SOD CHL